# Patient Record
Sex: FEMALE | NOT HISPANIC OR LATINO | Employment: FULL TIME | URBAN - METROPOLITAN AREA
[De-identification: names, ages, dates, MRNs, and addresses within clinical notes are randomized per-mention and may not be internally consistent; named-entity substitution may affect disease eponyms.]

---

## 2017-12-19 LAB — PAP SMEAR - HIM PATIENT REPORTED: NEGATIVE

## 2018-03-12 LAB
ABO + RH BLD: NORMAL
ABO + RH BLD: NORMAL
BLD GP AB SCN SERPL QL: NORMAL
HBV SURFACE AG SERPL QL IA: NORMAL
HIV 1+2 AB+HIV1 P24 AG SERPL QL IA: NORMAL
RUBELLA ANTIBODY IGG QUANTITATIVE: NORMAL IU/ML
T PALLIDUM IGG SER QL: NORMAL

## 2018-09-25 LAB — GROUP B STREP PCR: NORMAL

## 2018-10-13 ENCOUNTER — HOSPITAL ENCOUNTER (OUTPATIENT)
Facility: CLINIC | Age: 38
Discharge: HOME | End: 2018-10-13
Attending: OBSTETRICS & GYNECOLOGY | Admitting: OBSTETRICS & GYNECOLOGY
Payer: COMMERCIAL

## 2018-10-13 VITALS — TEMPERATURE: 97.5 F | DIASTOLIC BLOOD PRESSURE: 66 MMHG | SYSTOLIC BLOOD PRESSURE: 111 MMHG | RESPIRATION RATE: 16 BRPM

## 2018-10-13 LAB
ALT SERPL W P-5'-P-CCNC: 15 U/L (ref 0–50)
ANION GAP SERPL CALCULATED.3IONS-SCNC: 9 MMOL/L (ref 3–14)
AST SERPL W P-5'-P-CCNC: 16 U/L (ref 0–45)
BUN SERPL-MCNC: 10 MG/DL (ref 7–30)
CALCIUM SERPL-MCNC: 9 MG/DL (ref 8.5–10.1)
CHLORIDE SERPL-SCNC: 105 MMOL/L (ref 94–109)
CO2 SERPL-SCNC: 24 MMOL/L (ref 20–32)
CREAT SERPL-MCNC: 0.52 MG/DL (ref 0.52–1.04)
CREAT UR-MCNC: 12 MG/DL
ERYTHROCYTE [DISTWIDTH] IN BLOOD BY AUTOMATED COUNT: 13.2 % (ref 10–15)
GFR SERPL CREATININE-BSD FRML MDRD: >90 ML/MIN/1.7M2
GLUCOSE SERPL-MCNC: 98 MG/DL (ref 70–99)
HCT VFR BLD AUTO: 36.2 % (ref 35–47)
HGB BLD-MCNC: 12 G/DL (ref 11.7–15.7)
MCH RBC QN AUTO: 28 PG (ref 26.5–33)
MCHC RBC AUTO-ENTMCNC: 33.1 G/DL (ref 31.5–36.5)
MCV RBC AUTO: 85 FL (ref 78–100)
PLATELET # BLD AUTO: 188 10E9/L (ref 150–450)
POTASSIUM SERPL-SCNC: 3.9 MMOL/L (ref 3.4–5.3)
PROT UR-MCNC: <0.05 G/L
PROT/CREAT 24H UR: NORMAL G/G CR (ref 0–0.2)
RBC # BLD AUTO: 4.28 10E12/L (ref 3.8–5.2)
SODIUM SERPL-SCNC: 138 MMOL/L (ref 133–144)
URATE SERPL-MCNC: 5.1 MG/DL (ref 2.6–6)
WBC # BLD AUTO: 10.2 10E9/L (ref 4–11)

## 2018-10-13 PROCEDURE — 25000132 ZZH RX MED GY IP 250 OP 250 PS 637

## 2018-10-13 PROCEDURE — 80048 BASIC METABOLIC PNL TOTAL CA: CPT | Performed by: OBSTETRICS & GYNECOLOGY

## 2018-10-13 PROCEDURE — 84450 TRANSFERASE (AST) (SGOT): CPT | Performed by: OBSTETRICS & GYNECOLOGY

## 2018-10-13 PROCEDURE — 84550 ASSAY OF BLOOD/URIC ACID: CPT | Performed by: OBSTETRICS & GYNECOLOGY

## 2018-10-13 PROCEDURE — G0463 HOSPITAL OUTPT CLINIC VISIT: HCPCS | Mod: 25

## 2018-10-13 PROCEDURE — 84156 ASSAY OF PROTEIN URINE: CPT | Performed by: OBSTETRICS & GYNECOLOGY

## 2018-10-13 PROCEDURE — 84460 ALANINE AMINO (ALT) (SGPT): CPT | Performed by: OBSTETRICS & GYNECOLOGY

## 2018-10-13 PROCEDURE — 36415 COLL VENOUS BLD VENIPUNCTURE: CPT | Performed by: OBSTETRICS & GYNECOLOGY

## 2018-10-13 PROCEDURE — 59025 FETAL NON-STRESS TEST: CPT

## 2018-10-13 PROCEDURE — 82565 ASSAY OF CREATININE: CPT | Performed by: OBSTETRICS & GYNECOLOGY

## 2018-10-13 PROCEDURE — 85027 COMPLETE CBC AUTOMATED: CPT | Performed by: OBSTETRICS & GYNECOLOGY

## 2018-10-13 RX ORDER — PRENATAL VIT/IRON FUM/FOLIC AC 27MG-0.8MG
1 TABLET ORAL DAILY
COMMUNITY
End: 2022-05-04

## 2018-10-13 RX ORDER — ACETAMINOPHEN 325 MG/1
TABLET ORAL
Status: COMPLETED
Start: 2018-10-13 | End: 2018-10-13

## 2018-10-13 RX ORDER — METHYLPREDNISOLONE 4 MG
8 TABLET, DOSE PACK ORAL SEE ADMIN INSTRUCTIONS
Status: ON HOLD | COMMUNITY
End: 2018-10-19

## 2018-10-13 RX ORDER — ONDANSETRON 2 MG/ML
4 INJECTION INTRAMUSCULAR; INTRAVENOUS EVERY 6 HOURS PRN
Status: DISCONTINUED | OUTPATIENT
Start: 2018-10-13 | End: 2018-10-13 | Stop reason: HOSPADM

## 2018-10-13 RX ORDER — ACETAMINOPHEN 325 MG/1
975 TABLET ORAL ONCE
Status: COMPLETED | OUTPATIENT
Start: 2018-10-13 | End: 2018-10-13

## 2018-10-13 RX ADMIN — ACETAMINOPHEN 975 MG: 325 TABLET ORAL at 14:41

## 2018-10-13 RX ADMIN — ACETAMINOPHEN 975 MG: 325 TABLET, FILM COATED ORAL at 14:41

## 2018-10-13 NOTE — DISCHARGE INSTRUCTIONS
Discharge Instruction for Undelivered Patients      You were seen for: Evaluation of headache and possible preeclampsia  We Consulted: Dr. Rakel Dawkins  You had (Test or Medicine):Fetal Non Stress Test, Labs and UA     Diet:   Drink 8 to 12 glasses of liquids (milk, juice, water) every day.  You may eat meals and snacks.     Activity:  Count fetal kicks everyday (see handout)  Call your doctor or nurse midwife if your baby is moving less than usual.     Call your provider if you notice:  Swelling in your face or increased swelling in your hands or legs.  Headaches that are not relieved by Tylenol (acetaminophen).  Changes in your vision (blurring: seeing spots or stars.)  Nausea (sick to your stomach) and vomiting (throwing up).   Weight gain of 5 pounds or more per week.  Heartburn that doesn't go away.  Signs of bladder infection: pain when you urinate (use the toilet), need to go more often and more urgently.  The bag of salgado (rupture of membranes) breaks, or you notice leaking in your underwear.  Bright red blood in your underwear.  Abdominal (lower belly) or stomach pain.  For first baby: Contractions (tightening) less than 5 minutes apart for one hour or more.    Follow-up:  As scheduled in the clinic

## 2018-10-13 NOTE — IP AVS SNAPSHOT
MRN:[id number]                      After Visit Summary   10/13/2018    Gina Baez    MRN: [id number]           Thank you!     Thank you for choosing [facility] for your care. Our goal is always to provide you with excellent care. Hearing back from our patients is one way we can continue to improve our services. Please take a few minutes to complete the written survey that you may receive in the mail after you visit with us. Thank you!        Patient Information     Date Of Birth          1980        About your hospital stay     You were admitted on:  October 13, 2018 You last received care in the:  [facility]    You were discharged on:  October 13, 2018       Who to Call     For medical emergencies, please call 911.  For non-urgent questions about your medical care, please call your primary care provider or clinic, [phone]          Attending Provider     Provider Rakel Martinez MD OB/Gyn       Primary Care Provider Office Phone # Fax #    Tierney Lester Shaw [phone]      Further instructions from your care team       Discharge Instruction for Undelivered Patients      You were seen for: Evaluation of headache and possible preeclampsia  We Consulted: Dr. Rakel Dawkins  You had (Test or Medicine):Fetal Non Stress Test, Labs and UA     Diet:   Drink 8 to 12 glasses of liquids (milk, juice, water) every day.  You may eat meals and snacks.     Activity:  Count fetal kicks everyday (see handout)  Call your doctor or nurse midwife if your baby is moving less than usual.     Call your provider if you notice:  Swelling in your face or increased swelling in your hands or legs.  Headaches that are not relieved by Tylenol (acetaminophen).  Changes in your vision (blurring: seeing spots or stars.)  Nausea (sick to your stomach) and vomiting (throwing up).   Weight gain of 5 pounds or more per week.  Heartburn that doesn't go  "away.  Signs of bladder infection: pain when you urinate (use the toilet), need to go more often and more urgently.  The bag of salgado (rupture of membranes) breaks, or you notice leaking in your underwear.  Bright red blood in your underwear.  Abdominal (lower belly) or stomach pain.  For first baby: Contractions (tightening) less than 5 minutes apart for one hour or more.    Follow-up:  As scheduled in the clinic          Pending Results     Date and Time Order Name Status Description    10/13/2018 1407 Creatinine urine calculation only In process     10/13/2018 1234 Protein  random urine with Creat Ratio In process             Admission Information     Date & Time Provider Department Dept. Phone    10/13/2018 Rakel Dawkins MD [facility] [phone]      Your Vitals Were     Blood Pressure Temperature Respirations             111/66 97.5  F (36.4  C) (Temporal) 16         MyChart Information     [facility] lets you send messages to your doctor, view your test results, renew your prescriptions, schedule appointments and more. To sign up, go to [email or web address] . Click on \"Log in\" on the left side of the screen, which will take you to the Welcome page. Then click on \"Sign up Now\" on the right side of the page.     You will be asked to enter the access code listed below, as well as some personal information. Please follow the directions to create your username and password.     Your access code is: [email or web address]  Expires: 2019  2:35 PM     Your access code will  in 90 days. If you need help or a new code, please call your [facility] clinic or [phone].        Care EveryWhere ID     This is your Care EveryWhere ID. This could be used by other organizations to access your [facility] medical records  [id number]        Equal Access to Services     UMBERTO VELASCO : Laura Martinez, rosemary paniagua, qageoffta kaalisabel romero. So [facility] " [phone].    ATENCIÓN: Si sarbjit shah, tiene a anne disposición servicios gratuitos de asistencia lingüística. Nallely styles [phone].    We comply with applicable federal civil rights laws and [place] laws. We do not discriminate on the basis of race, color, national origin, age, disability, sex, sexual orientation, or gender identity.               Review of your medicines      UNREVIEWED medicines. Ask your doctor about these medicines        Dose / Directions    methylPREDNISolone 4 MG tablet   Commonly known as:  MEDROL DOSEPAK        Dose:  8 mg   Take 8 mg by mouth See Admin Instructions follow package directions   Refills:  0       prenatal multivitamin plus iron 27-0.8 MG Tabs per tablet        Dose:  1 tablet   Take 1 tablet by mouth daily   Refills:  0       SYNTHROID PO        Dose:  137 mcg   Take 137 mcg by mouth daily   Refills:  0       VITAMIN D (CHOLECALCIFEROL) PO        Dose:  2000 Units   Take 2,000 Units by mouth daily   Refills:  0                Protect others around you: Learn how to safely use, store and throw away your medicines at www.disposemymeds.org.             Medication List: This is a list of all your medications and when to take them. Check marks below indicate your daily home schedule. Keep this list as a reference.      Medications           Morning Afternoon Evening Bedtime As Needed    methylPREDNISolone 4 MG tablet   Commonly known as:  MEDROL DOSEPAK   Take 8 mg by mouth See Admin Instructions follow package directions                                prenatal multivitamin plus iron 27-0.8 MG Tabs per tablet   Take 1 tablet by mouth daily                                SYNTHROID PO   Take 137 mcg by mouth daily                                VITAMIN D (CHOLECALCIFEROL) PO   Take 2,000 Units by mouth daily

## 2018-10-13 NOTE — IP AVS SNAPSHOT
[facility]    [address] [place] [address], [address]    [facility] [address]    Phone:  [phone]                                       After Visit Summary   10/13/2018    Gina Baez    MRN: [id number]           After Visit Summary Signature Page     I have received my discharge instructions, and my questions have been answered. I have discussed any challenges I see with this plan with the nurse or doctor.    ..........................................................................................................................................  Patient/Patient Representative Signature      ..........................................................................................................................................  Patient Representative Print Name and Relationship to Patient    ..................................................               ................................................  Date                                   Time    ..........................................................................................................................................  Reviewed by Signature/Title    ...................................................              ..............................................  Date                                               Time          22EPIC Rev 08/18

## 2018-10-13 NOTE — PLAN OF CARE
"Pt presents, ambulatory, accompanied by her  Will, stating \"evaluation of a headache possible related to BP's\" as the reason for her visit. Pt to MAC room 1 with Cisco Umanzor RN. Verbal consent for EFM/toco received. Monitors applied. Fht's 140's. Admission documentation completed by Cisco Umanzor RN.     Pt is a  39w3d with a recent diagnosis of PUPPS and was started on a Medrol pack on Thursday. Pt states she has been unable to sleep well since starting the medication and had an onset of a headache Friday 10/12/18 that has been persistent. Pt denies taking pain medication or any other interventions for the headache. Pt's BP's at her last 2 prenatal visits were 120/80 and 120/90 respectively and thus was sent here for an evaluation of possible preeclampsia by Dr. Dawkins. Pt reports +FM, denies visual disturbances, epigastric pain, VB, LOF, abdominal cramping, low back pain, pelvic discomfort or dysuria.   Reactive NST obtained. Presumed adequate fetal oxygenation at this time.   UA collected and sent  Dr. Dawkins updated regarding, but not limited to, Serial BP's, Lab results, uterine activity, fht's and pts report of headache pain. Order received for PO Tylenol and discharge to home.   PO tylenol per order given. UA resulted and negative. Discharge orders reviewed with pt and spouse. Questions answered.  Both verbalized understanding and agreement. Pt d/c'd to home, ambulatory.     "

## 2018-10-19 ENCOUNTER — HOSPITAL ENCOUNTER (INPATIENT)
Facility: CLINIC | Age: 38
LOS: 2 days | Discharge: HOME | End: 2018-10-21
Attending: OBSTETRICS & GYNECOLOGY | Admitting: SPECIALIST
Payer: COMMERCIAL

## 2018-10-19 LAB
ABO + RH BLD: NORMAL
ABO + RH BLD: NORMAL
BLD GP AB SCN SERPL QL: NORMAL
BLOOD BANK CMNT PATIENT-IMP: NORMAL
SPECIMEN EXP DATE BLD: NORMAL
T PALLIDUM AB SER QL: NONREACTIVE

## 2018-10-19 PROCEDURE — 86850 RBC ANTIBODY SCREEN: CPT | Performed by: SPECIALIST

## 2018-10-19 PROCEDURE — 86780 TREPONEMA PALLIDUM: CPT | Performed by: SPECIALIST

## 2018-10-19 PROCEDURE — 25000128 H RX IP 250 OP 636: Performed by: SPECIALIST

## 2018-10-19 PROCEDURE — 12000037 ZZH R&B POSTPARTUM INTERMEDIATE

## 2018-10-19 PROCEDURE — 86900 BLOOD TYPING SEROLOGIC ABO: CPT | Performed by: SPECIALIST

## 2018-10-19 PROCEDURE — 36415 COLL VENOUS BLD VENIPUNCTURE: CPT | Performed by: SPECIALIST

## 2018-10-19 PROCEDURE — 59025 FETAL NON-STRESS TEST: CPT

## 2018-10-19 PROCEDURE — 0KQM0ZZ REPAIR PERINEUM MUSCLE, OPEN APPROACH: ICD-10-PCS | Performed by: SPECIALIST

## 2018-10-19 PROCEDURE — 0UQGXZZ REPAIR VAGINA, EXTERNAL APPROACH: ICD-10-PCS | Performed by: SPECIALIST

## 2018-10-19 PROCEDURE — 25000128 H RX IP 250 OP 636

## 2018-10-19 PROCEDURE — 72200001 ZZH LABOR CARE VAGINAL DELIVERY SINGLE

## 2018-10-19 PROCEDURE — 25000125 ZZHC RX 250: Performed by: SPECIALIST

## 2018-10-19 PROCEDURE — 25000132 ZZH RX MED GY IP 250 OP 250 PS 637: Performed by: SPECIALIST

## 2018-10-19 PROCEDURE — 12000031 ZZH R&B OB CRITICAL

## 2018-10-19 PROCEDURE — G0463 HOSPITAL OUTPT CLINIC VISIT: HCPCS | Mod: 25

## 2018-10-19 PROCEDURE — 86901 BLOOD TYPING SEROLOGIC RH(D): CPT | Performed by: SPECIALIST

## 2018-10-19 RX ORDER — OXYTOCIN 10 [USP'U]/ML
10 INJECTION, SOLUTION INTRAMUSCULAR; INTRAVENOUS
Status: DISCONTINUED | OUTPATIENT
Start: 2018-10-19 | End: 2018-10-19

## 2018-10-19 RX ORDER — METHYLERGONOVINE MALEATE 0.2 MG/ML
200 INJECTION INTRAVENOUS
Status: DISCONTINUED | OUTPATIENT
Start: 2018-10-19 | End: 2018-10-21 | Stop reason: HOSPADM

## 2018-10-19 RX ORDER — LEVOTHYROXINE SODIUM 137 UG/1
137 TABLET ORAL DAILY
Status: DISCONTINUED | OUTPATIENT
Start: 2018-10-20 | End: 2018-10-21 | Stop reason: HOSPADM

## 2018-10-19 RX ORDER — OXYTOCIN/0.9 % SODIUM CHLORIDE 30/500 ML
100-340 PLASTIC BAG, INJECTION (ML) INTRAVENOUS CONTINUOUS PRN
Status: COMPLETED | OUTPATIENT
Start: 2018-10-19 | End: 2018-10-19

## 2018-10-19 RX ORDER — FENTANYL CITRATE 50 UG/ML
INJECTION, SOLUTION INTRAMUSCULAR; INTRAVENOUS
Status: COMPLETED
Start: 2018-10-19 | End: 2018-10-19

## 2018-10-19 RX ORDER — ACETAMINOPHEN 325 MG/1
650 TABLET ORAL EVERY 4 HOURS PRN
Status: DISCONTINUED | OUTPATIENT
Start: 2018-10-19 | End: 2018-10-21 | Stop reason: HOSPADM

## 2018-10-19 RX ORDER — ONDANSETRON 2 MG/ML
4 INJECTION INTRAMUSCULAR; INTRAVENOUS EVERY 6 HOURS PRN
Status: DISCONTINUED | OUTPATIENT
Start: 2018-10-19 | End: 2018-10-19

## 2018-10-19 RX ORDER — MISOPROSTOL 200 UG/1
800 TABLET ORAL
Status: DISCONTINUED | OUTPATIENT
Start: 2018-10-19 | End: 2018-10-21 | Stop reason: HOSPADM

## 2018-10-19 RX ORDER — NALOXONE HYDROCHLORIDE 0.4 MG/ML
.1-.4 INJECTION, SOLUTION INTRAMUSCULAR; INTRAVENOUS; SUBCUTANEOUS
Status: DISCONTINUED | OUTPATIENT
Start: 2018-10-19 | End: 2018-10-21 | Stop reason: HOSPADM

## 2018-10-19 RX ORDER — OXYTOCIN/0.9 % SODIUM CHLORIDE 30/500 ML
340 PLASTIC BAG, INJECTION (ML) INTRAVENOUS CONTINUOUS PRN
Status: DISCONTINUED | OUTPATIENT
Start: 2018-10-19 | End: 2018-10-21 | Stop reason: HOSPADM

## 2018-10-19 RX ORDER — TERBUTALINE SULFATE 1 MG/ML
0.25 INJECTION, SOLUTION SUBCUTANEOUS
Status: DISCONTINUED | OUTPATIENT
Start: 2018-10-19 | End: 2018-10-19

## 2018-10-19 RX ORDER — ACETAMINOPHEN 325 MG/1
650 TABLET ORAL EVERY 4 HOURS PRN
Status: DISCONTINUED | OUTPATIENT
Start: 2018-10-19 | End: 2018-10-19

## 2018-10-19 RX ORDER — CARBOPROST TROMETHAMINE 250 UG/ML
250 INJECTION, SOLUTION INTRAMUSCULAR
Status: DISCONTINUED | OUTPATIENT
Start: 2018-10-19 | End: 2018-10-19

## 2018-10-19 RX ORDER — FENTANYL CITRATE 50 UG/ML
100 INJECTION, SOLUTION INTRAMUSCULAR; INTRAVENOUS
Status: DISCONTINUED | OUTPATIENT
Start: 2018-10-19 | End: 2018-10-19

## 2018-10-19 RX ORDER — METHYLERGONOVINE MALEATE 0.2 MG/ML
200 INJECTION INTRAVENOUS
Status: DISCONTINUED | OUTPATIENT
Start: 2018-10-19 | End: 2018-10-19

## 2018-10-19 RX ORDER — OXYTOCIN/0.9 % SODIUM CHLORIDE 30/500 ML
100 PLASTIC BAG, INJECTION (ML) INTRAVENOUS CONTINUOUS
Status: DISCONTINUED | OUTPATIENT
Start: 2018-10-19 | End: 2018-10-21 | Stop reason: HOSPADM

## 2018-10-19 RX ORDER — MISOPROSTOL 100 UG/1
25 TABLET ORAL
Status: DISCONTINUED | OUTPATIENT
Start: 2018-10-19 | End: 2018-10-19

## 2018-10-19 RX ORDER — IBUPROFEN 400 MG/1
800 TABLET, FILM COATED ORAL EVERY 6 HOURS PRN
Status: DISCONTINUED | OUTPATIENT
Start: 2018-10-19 | End: 2018-10-21 | Stop reason: HOSPADM

## 2018-10-19 RX ORDER — LANOLIN 100 %
OINTMENT (GRAM) TOPICAL
Status: DISCONTINUED | OUTPATIENT
Start: 2018-10-19 | End: 2018-10-21 | Stop reason: HOSPADM

## 2018-10-19 RX ORDER — IBUPROFEN 400 MG/1
800 TABLET, FILM COATED ORAL
Status: DISCONTINUED | OUTPATIENT
Start: 2018-10-19 | End: 2018-10-19

## 2018-10-19 RX ORDER — OXYCODONE AND ACETAMINOPHEN 5; 325 MG/1; MG/1
1 TABLET ORAL
Status: DISCONTINUED | OUTPATIENT
Start: 2018-10-19 | End: 2018-10-19

## 2018-10-19 RX ORDER — BISACODYL 10 MG
10 SUPPOSITORY, RECTAL RECTAL DAILY PRN
Status: DISCONTINUED | OUTPATIENT
Start: 2018-10-21 | End: 2018-10-21 | Stop reason: HOSPADM

## 2018-10-19 RX ORDER — OXYTOCIN 10 [USP'U]/ML
10 INJECTION, SOLUTION INTRAMUSCULAR; INTRAVENOUS
Status: DISCONTINUED | OUTPATIENT
Start: 2018-10-19 | End: 2018-10-21 | Stop reason: HOSPADM

## 2018-10-19 RX ORDER — DOCUSATE SODIUM 100 MG/1
100 CAPSULE, LIQUID FILLED ORAL 2 TIMES DAILY
Status: DISCONTINUED | OUTPATIENT
Start: 2018-10-19 | End: 2018-10-21 | Stop reason: HOSPADM

## 2018-10-19 RX ORDER — SODIUM CHLORIDE, SODIUM LACTATE, POTASSIUM CHLORIDE, CALCIUM CHLORIDE 600; 310; 30; 20 MG/100ML; MG/100ML; MG/100ML; MG/100ML
INJECTION, SOLUTION INTRAVENOUS CONTINUOUS
Status: DISCONTINUED | OUTPATIENT
Start: 2018-10-19 | End: 2018-10-19

## 2018-10-19 RX ORDER — NALOXONE HYDROCHLORIDE 0.4 MG/ML
.1-.4 INJECTION, SOLUTION INTRAMUSCULAR; INTRAVENOUS; SUBCUTANEOUS
Status: DISCONTINUED | OUTPATIENT
Start: 2018-10-19 | End: 2018-10-19

## 2018-10-19 RX ORDER — HYDROCORTISONE 2.5 %
CREAM (GRAM) TOPICAL 3 TIMES DAILY PRN
Status: DISCONTINUED | OUTPATIENT
Start: 2018-10-19 | End: 2018-10-21 | Stop reason: HOSPADM

## 2018-10-19 RX ADMIN — Medication 25 MCG: at 15:45

## 2018-10-19 RX ADMIN — FENTANYL CITRATE 100 MCG: 50 INJECTION, SOLUTION INTRAMUSCULAR; INTRAVENOUS at 18:35

## 2018-10-19 RX ADMIN — Medication 25 MCG: at 17:52

## 2018-10-19 RX ADMIN — DOCUSATE SODIUM 100 MG: 100 CAPSULE, LIQUID FILLED ORAL at 23:16

## 2018-10-19 RX ADMIN — OXYTOCIN-SODIUM CHLORIDE 0.9% IV SOLN 30 UNIT/500ML 340 ML/HR: 30-0.9/5 SOLUTION at 20:54

## 2018-10-19 RX ADMIN — IBUPROFEN 800 MG: 400 TABLET ORAL at 23:16

## 2018-10-19 RX ADMIN — Medication 25 MCG: at 10:39

## 2018-10-19 RX ADMIN — Medication 25 MCG: at 08:34

## 2018-10-19 RX ADMIN — Medication 25 MCG: at 12:29

## 2018-10-19 RX ADMIN — ACETAMINOPHEN 650 MG: 325 TABLET, FILM COATED ORAL at 23:16

## 2018-10-19 RX ADMIN — FENTANYL CITRATE 100 MCG: 50 INJECTION, SOLUTION INTRAMUSCULAR; INTRAVENOUS at 21:08

## 2018-10-19 NOTE — IP AVS SNAPSHOT
MRN:[id number]                      After Visit Summary   10/19/2018    Gina Baez    MRN: [id number]           Thank you!     Thank you for choosing [facility] for your care. Our goal is always to provide you with excellent care. Hearing back from our patients is one way we can continue to improve our services. Please take a few minutes to complete the written survey that you may receive in the mail after you visit with us. Thank you!        Patient Information     Date Of Birth          1980        About your hospital stay     You were admitted on:  October 19, 2018 You last received care in the:  [address]    You were discharged on:  October 21, 2018        Reason for your hospital stay       Maternity care                  Who to Call     For medical emergencies, please call 911.  For non-urgent questions about your medical care, please call your primary care provider or clinic, [phone]          Attending Provider     Provider Specialty    Rakel Dawkins MD OB/Gyn    [place], Balbina Wiley MD OB/Gyn    , Tierney Batista MD OB/Gyn       Primary Care Provider Office Phone # Fax #    Tierney Shaw [phone]      After Care Instructions     Activity       Review discharge instructions            Diet       Resume previous diet            Discharge Instructions - Postpartum visit       Schedule postpartum visit with your provider and return to clinic in 6 weeks.                  Follow-up Appointments     Follow Up and recommended labs and tests       [facility]                  Further instructions from your care team       Postpartum Vaginal Delivery Instructions    Activity       Ask family and friends for help when you need it.    Do not place anything in your vagina for 6 weeks.    You are not restricted on other activities, but take it easy for a few weeks to allow your body to  recover from delivery.  You are able to do any activities you feel up to that point.    No driving until you have stopped taking your pain medications (usually two weeks after delivery).     Call your health care provider if you have any of these symptoms:       Increased pain, swelling, redness, or fluid around your stiches from an episiotomy or perineal tear.    A fever above 100.4 F (38 C) with or without chills when placing a thermometer under your tongue.    You soak a sanitary pad with blood within 1 hour, or you see blood clots larger than a golf ball.    Bleeding that lasts more than 6 weeks.    Vaginal discharge that smells bad.    Severe pain, cramping or tenderness in your lower belly area.    A need to urinate more frequently (use the toilet more often), more urgently (use the toilet very quickly), or it burns when you urinate.    Nausea and vomiting.    Redness, swelling or pain around a vein in your leg.    Problems breastfeeding or a red or painful area on your breast.    Chest pain and cough or are gasping for air.    Problems coping with sadness, anxiety, or depression.  If you have any concerns about hurting yourself or the baby, call your provider immediately.     You have questions or concerns after you return home.     Keep your hands clean:  Always wash your hands before touching your perineal area and stitches.  This helps reduce your risk of infection.  If your hands aren't dirty, you may use an alcohol hand-rub to clean your hands. Keep your nails clean and short.        Pending Results     No orders found from 10/17/2018 to 10/20/2018.            Statement of Approval     Ordered          10/21/18 1200  I have reviewed and agree with all the recommendations and orders detailed in this document.  EFFECTIVE NOW     Approved and electronically signed by:  Balbina Perry MD             Admission Information     Date & Time Provider Department Dept. Phone    10/19/2018 Tierney Shaw  "MD Lester [address] [phone]      Your Vitals Were     Blood Pressure Pulse Temperature Respirations Pulse Oximetry       97/57 (BP Location: Left arm) 79 97.8  F (36.6  C) (Oral) 16 98%       MyChart Information     [email or web address] lets you send messages to your doctor, view your test results, renew your prescriptions, schedule appointments and more. To sign up, go to [email or web address] . Click on \"Log in\" on the left side of the screen, which will take you to the Welcome page. Then click on \"Sign up Now\" on the right side of the page.     You will be asked to enter the access code listed below, as well as some personal information. Please follow the directions to create your username and password.     Your access code is: [email or web address]  Expires: 2019  2:35 PM     Your access code will  in 90 days. If you need help or a new code, please call your [facility] clinic or [phone].        Care EveryWhere ID     This is your Care EveryWhere ID. This could be used by other organizations to access your [facility] medical records  [id number]        Equal Access to Services     UMBERTO VELASCO : Laura Martinez, rosemary paniagua, dat bishop, isabel johnson. So [facility] [phone].    ATENCIÓN: Si habla español, tiene a anne disposición servicios gratuitos de asistencia lingüística. Llame al [phone].    We comply with applicable federal civil rights laws and [place] laws. We do not discriminate on the basis of race, color, national origin, age, disability, sex, sexual orientation, or gender identity.               Review of your medicines      START taking        Dose / Directions    acetaminophen 325 MG tablet   Commonly known as:  TYLENOL        Dose:  325-650 mg   Take 1-2 tablets (325-650 mg) by mouth every 4 hours as needed for mild pain or fever (greater than or equal to 38? C /100.4? F (oral) or 38.5? C/ 101.4? F " (core).)   Quantity:  30 tablet   Refills:  0       ibuprofen 600 MG tablet   Commonly known as:  ADVIL/MOTRIN        Dose:  600 mg   Take 1 tablet (600 mg) by mouth every 6 hours as needed for other (cramping)   Quantity:  30 tablet   Refills:  0         CONTINUE these medicines which have NOT CHANGED        Dose / Directions    prenatal multivitamin plus iron 27-0.8 MG Tabs per tablet        Dose:  1 tablet   Take 1 tablet by mouth daily   Refills:  0       SYNTHROID PO        Dose:  137 mcg   Take 137 mcg by mouth daily   Refills:  0       VITAMIN D (CHOLECALCIFEROL) PO        Dose:  2000 Units   Take 2,000 Units by mouth daily   Refills:  0            Where to get your medicines      Some of these will need a paper prescription and others can be bought over the counter. Ask your nurse if you have questions.     Bring a paper prescription for each of these medications     acetaminophen 325 MG tablet    ibuprofen 600 MG tablet                Protect others around you: Learn how to safely use, store and throw away your medicines at www.disposemymeds.org.             Medication List: This is a list of all your medications and when to take them. Check marks below indicate your daily home schedule. Keep this list as a reference.      Medications           Morning Afternoon Evening Bedtime As Needed    acetaminophen 325 MG tablet   Commonly known as:  TYLENOL   Take 1-2 tablets (325-650 mg) by mouth every 4 hours as needed for mild pain or fever (greater than or equal to 38? C /100.4? F (oral) or 38.5? C/ 101.4? F (core).)   Last time this was given:  650 mg on 10/21/2018 10:27 AM                                ibuprofen 600 MG tablet   Commonly known as:  ADVIL/MOTRIN   Take 1 tablet (600 mg) by mouth every 6 hours as needed for other (cramping)   Last time this was given:  800 mg on 10/21/2018 10:27 AM                                prenatal multivitamin plus iron 27-0.8 MG Tabs per tablet   Take 1 tablet by mouth  daily                                SYNTHROID PO   Take 137 mcg by mouth daily   Last time this was given:  137 mcg on 10/21/2018  6:48 AM                                VITAMIN D (CHOLECALCIFEROL) PO   Take 2,000 Units by mouth daily

## 2018-10-19 NOTE — IP AVS SNAPSHOT
[address]    [place] [address]    Phone:  [phone]                                       After Visit Summary   10/19/2018    Gina Baez    MRN: [id number]           After Visit Summary Signature Page     I have received my discharge instructions, and my questions have been answered. I have discussed any challenges I see with this plan with the nurse or doctor.    ..........................................................................................................................................  Patient/Patient Representative Signature      ..........................................................................................................................................  Patient Representative Print Name and Relationship to Patient    ..................................................               ................................................  Date                                   Time    ..........................................................................................................................................  Reviewed by Signature/Title    ...................................................              ..............................................  Date                                               Time          22EPIC Rev 08/18

## 2018-10-19 NOTE — PLAN OF CARE
Pt to MAC for eval of SROM. TOCO and EFM applied. Pt grossly ruptured, clear fluid. Occ contractions, not feeling. Dr Perry on unit, updated on pt status. She will make a plan. Report to CRISTIANA Knutson RN to assume care.

## 2018-10-19 NOTE — PLAN OF CARE
10/19/18- 0800- Dr Perry at bedside -NON for oral cytotec.  1745-update given to Dr Perry-oral cytotec x12 doses ordered.  1900- Increased back discomfort with ctx- mild to moderate on palpation-IV pain meds adm.-IV bolus given- patient resting with counter pressure to lower back via spouse. Report given to next shift.

## 2018-10-19 NOTE — H&P
OB ADMISSION NOTE    CHIEF COMPLAINT:   Presents to labor and delivery with SROM    HISTORY OF PRESENT ILLNESS:  37 yo  at 40+2 weeks presented with SROM this am.  Was noted to have contractions in the MAC but was not feeling them.  Pregnancy complicated by hypothyroidism and AMA.    OBSTETRICAL / DATING HISTORY:  Estimated Date of Delivery: Oct 17, 2018  Gestational Age:  40w2d    Obstetric History       T0      L0     SAB1   TAB0   Ectopic0   Multiple0   Live Births0       # Outcome Date GA Lbr Ayaan/2nd Weight Sex Delivery Anes PTL Lv   2 Current            1 SAB                    LAB TESTING:  See prenatal records.      PAST MEDICAL HISTORY:  Past Medical History:   Diagnosis Date     Pregnancy pruritus, third trimester      Thyroid disease     hypothroid       PAST SURGICAL HISTORY:  History reviewed. No pertinent surgical history.    ALLERGIES:  NKA    HABITS:  No tobacco//etoh/drugs    HISTORY OF PRESENT ILLNESS:    (Please see scanned  sheets for prenatal history. Examination at the time of admission revealed no interval change in the patient s history or physical exam except as described below.)    Prenatal chart not available at this time as she was just admitted.      REVIEW OF SYSTEMS:  NEUROLOGIC:  Negative  EYES:  Negative  ENT:  Negative  GI:  Negative  BREAST:  Negative  :  Negative  GYN:  Negative  CV:  Negative  PULMONARY:  Negative  MUSCULOSKELETAL:  Negative  PSYCH:  Negative  PHYSICAL EXAM: /81  Temp 98.3  F (36.8  C) (Oral)    Gen:  NAD  CV: Regular rate  RESP:  Unlabored breathing  ABDOMEN: gravid, nontender  Membrane Status:  Grossly ruptured  Fetal Presentation:  vertex  Cervix:  1/60/-1/ medium/posterior  GBS:  neg  EFW:  7-8#    Fetal heart tones: category 1  TOCO: irritablity    Impression:  IUP at 40w2d with SROM  Patient Active Problem List   Diagnosis     Indication for care or intervention in labor or delivery     Indication for care in  labor or delivery       Plan:  Oral cytotec until more favorable, then will start pitocin when more favorable.    Balbina Perry ....................  10/19/2018   7:59 AM , pager: [phone]

## 2018-10-20 PROCEDURE — 25000132 ZZH RX MED GY IP 250 OP 250 PS 637: Performed by: SPECIALIST

## 2018-10-20 PROCEDURE — 12000037 ZZH R&B POSTPARTUM INTERMEDIATE

## 2018-10-20 RX ADMIN — LEVOTHYROXINE SODIUM 137 MCG: 137 TABLET ORAL at 05:55

## 2018-10-20 RX ADMIN — ACETAMINOPHEN 650 MG: 325 TABLET, FILM COATED ORAL at 15:17

## 2018-10-20 RX ADMIN — DOCUSATE SODIUM 100 MG: 100 CAPSULE, LIQUID FILLED ORAL at 08:20

## 2018-10-20 RX ADMIN — IBUPROFEN 800 MG: 400 TABLET ORAL at 22:31

## 2018-10-20 RX ADMIN — IBUPROFEN 800 MG: 400 TABLET ORAL at 15:17

## 2018-10-20 RX ADMIN — ACETAMINOPHEN 650 MG: 325 TABLET, FILM COATED ORAL at 22:32

## 2018-10-20 RX ADMIN — DOCUSATE SODIUM 100 MG: 100 CAPSULE, LIQUID FILLED ORAL at 20:00

## 2018-10-20 RX ADMIN — IBUPROFEN 800 MG: 400 TABLET ORAL at 07:20

## 2018-10-20 RX ADMIN — ACETAMINOPHEN 650 MG: 325 TABLET, FILM COATED ORAL at 07:20

## 2018-10-20 NOTE — PROGRESS NOTES
Precipitous delivery by RN, placenta delivered per Dr. Cardoza.  Patient examined. Right sulcal tear present, large 2nd degree laceration present  Fentanyl 100 mcg given IV. Tissue infiltrated with 20 ml 1% Lidocaine   Repair done with 3.0 Vicryl in layers.    cc

## 2018-10-20 NOTE — PROVIDER NOTIFICATION
10/19/18 2010   Provider Notification   Provider Name/Title [place]   Method of Notification Phone   Request Evaluate - Remote   Notification Reason SVE   updated Dr. Perry that patient is complete/+1.  POC per Dr. Perry is to start pushing.

## 2018-10-20 NOTE — PROGRESS NOTES
Pt. admitted from L&D via w/c. Pt. arrived with baby and was accompanied by spouse and arrived with personal belongings. Report was taken from Milena in L&D.  Pt. is A&Ox3 and VSS on RA. Fundus is firm and slightly to the left.  Vaginal bleeding is small.   Pt. c/o 0/10 pain. Pt. Denied c/o nausea, CP, SOB, lightheadedness, or dizziness. LS clear. PIV is SL. Pt. oriented to the room and call light system.

## 2018-10-20 NOTE — L&D DELIVERY NOTE
of viable male at    Baby's weight unavailable     Apgars unavailable     precip delivery by RN Sharmaine Lyn as I entered the room. Head was full delivered as both myself and RN arrived at bedside. RN able to then deliver the Shoulders  easily and nuchal cord x1 was reduced after shoulders delivered and placed infant on maternal abdomen.  Placenta delivered at , intact with a 3vc  Excellent uterine tone  Dr. Shaw arrived and repaired the laceration. See note for details    Primary OB:

## 2018-10-20 NOTE — PLAN OF CARE
Problem: Patient Care Overview  Goal: Plan of Care/Patient Progress Review  Outcome: No Change  VSS.  Pain well controlled with Tyl and Ibu, requesting prn pain medications as needed.  Up independently in room.  Working on breastfeeding  using shield and  cares. On pathway. Continue to monitor and notify MD as needed.

## 2018-10-20 NOTE — PLAN OF CARE
Problem: Patient Care Overview  Goal: Individualization & Mutuality  Outcome: Completed Date Met: 10/19/18  Pt was unable to receive epidural due to timing of cervical dilatation.

## 2018-10-20 NOTE — PROVIDER NOTIFICATION
10/19/18 1950   Provider Notification   Provider Name/Title Jahaira   Method of Notification Phone   Request Evaluate - Remote   Notification Reason Labor Status;Uterine Activity;Other (Comment)  (POC)   spoke with Dr. Perry regarding patient pain level and contraction frequency. POC: SVE and ok to get epidural if cervical change

## 2018-10-20 NOTE — PROGRESS NOTES
Mother using nipple shield. Reviewed indications that infant is receiving adequate nutrition through use of feeding log. Also educated patient regarding examining the nipple shield for droplets of colostrum after breastfeeding occurrence and listening for audible swallowing while breastfeeding.

## 2018-10-20 NOTE — PROGRESS NOTES
[facility]   Post-Partum Progress Note          Assessment and Plan:    Assessment:   Post-partum day #1  Induced vaginal delivery secondary to premature rupture of membranes  L&D complications: Precipitous delivery      Doing well.      Plan:   Pain control measures as needed  Reportable signs and symptoms dicussed with the patient           Interval History:   Doing well.  Pain is well-controlled.  No fevers.  No history of foul-smelling vaginal discharge.  Good appetite.  Denies chest pain, shortness of breath, nausea or vomiting.  Vaginal bleeding is similar to a heavy menstrual flow.  Ambulatory.  Breastfeeding well.          Significant Problems:      Patient Active Problem List   Diagnosis     Indication for care or intervention in labor or delivery     Indication for care in labor or delivery      (spontaneous vaginal delivery)             Review of Systems:    The patient denies any chest pain, shortness of breath, excessive pain, fever, chills, purulent drainage from the wound, nausea or vomiting.          Medications:   All medications related to the patient's surgery have been reviewed          Physical Exam:   All vitals stable  Blood pressure 114/70, pulse 83, temperature 98.1  F (36.7  C), temperature source Oral, resp. rate 16, SpO2 98 %, unknown if currently breastfeeding.  Uterine fundus is firm, non-tender and at the level of the umbilicus          Data:   All laboratory data related to this surgery reviewed  Lab Results   Component Value Date    HGB 12.0 10/13/2018       RAN NERI MD

## 2018-10-20 NOTE — LACTATION NOTE
Initial visit. Infant at breast at time of visit.  Mother laying down in bed and awkwardly positioned in bed.  Encouraged and helped Mother reposition in bed making herself more comfortable and then bringing baby to her.  LC helped position infant in football position.  Reviewed use of nipple shield and helped position on left breast.  Infant brought to breast and immediately able to latch on with a good latch and strong suck.    Breastfeeding general information reviewed.  No further questions at this time. Will follow as needed.   Ilsa Back RN, IBCLC

## 2018-10-20 NOTE — PROVIDER NOTIFICATION
10/19/18 2020   Provider Notification   Provider Name/Title [place]   Method of Notification Phone   Request Evaluate - Remote   Notification Reason Other (Comment)   Updated Dr. Perry that FHT having variable decels with pushing, asked her to come to evaluate.  She said she was stuck at [facility] and would call in her back-up, Dr Shaw.

## 2018-10-20 NOTE — PLAN OF CARE
1950 SVE per POC of Dr. Perry  2000 SVE 10/100/+1, spoke with Dr. Perry- POC start pushing  2020 room set up complete, started pushing  2026 O2 and LR bolus given for variable decels with pushing Dr. Perry updated on decels and asked her to come evaluate.  She reported that her backup would come (Dr. Shaw)  2045 in house OB called for delivery d/t involuntary pushing of patient  2051 living baby boy born  2054 placenta delivered, EBL per Dr. Shaw 300mL, 2nd degree tear and right sulcus, post partum pitocin started immediately  2108 100 mcg of IV fentanyl given for repair pain

## 2018-10-20 NOTE — PLAN OF CARE
Data: Gina Baez transferred to [facility] via wheelchair at 2345. Baby transferred via parent's arms.  Action: Receiving unit notified of transfer: Yes. Patient and family notified of room change. Report given to Noemi Ojeda RN at 0000. Belongings sent to receiving unit. Accompanied by Registered Nurse. Oriented patient to surroundings. Call light within reach. ID bands double-checked with receiving RN.  Response: Patient tolerated transfer and is stable.

## 2018-10-21 VITALS
RESPIRATION RATE: 16 BRPM | TEMPERATURE: 97.8 F | HEART RATE: 79 BPM | SYSTOLIC BLOOD PRESSURE: 97 MMHG | OXYGEN SATURATION: 98 % | DIASTOLIC BLOOD PRESSURE: 57 MMHG

## 2018-10-21 PROCEDURE — 25000132 ZZH RX MED GY IP 250 OP 250 PS 637: Performed by: SPECIALIST

## 2018-10-21 RX ORDER — IBUPROFEN 600 MG/1
600 TABLET, FILM COATED ORAL EVERY 6 HOURS PRN
Qty: 30 TABLET | Refills: 0 | Status: SHIPPED | OUTPATIENT
Start: 2018-10-21 | End: 2022-05-04

## 2018-10-21 RX ORDER — ACETAMINOPHEN 325 MG/1
325-650 TABLET ORAL EVERY 4 HOURS PRN
Qty: 30 TABLET | Refills: 0 | Status: SHIPPED | OUTPATIENT
Start: 2018-10-21 | End: 2022-05-04

## 2018-10-21 RX ADMIN — ACETAMINOPHEN 650 MG: 325 TABLET, FILM COATED ORAL at 10:27

## 2018-10-21 RX ADMIN — ACETAMINOPHEN 650 MG: 325 TABLET, FILM COATED ORAL at 04:22

## 2018-10-21 RX ADMIN — IBUPROFEN 800 MG: 400 TABLET ORAL at 04:22

## 2018-10-21 RX ADMIN — IBUPROFEN 800 MG: 400 TABLET ORAL at 10:27

## 2018-10-21 RX ADMIN — LEVOTHYROXINE SODIUM 137 MCG: 137 TABLET ORAL at 06:48

## 2018-10-21 RX ADMIN — DOCUSATE SODIUM 100 MG: 100 CAPSULE, LIQUID FILLED ORAL at 08:43

## 2018-10-21 NOTE — DISCHARGE SUMMARY
OB DISCHARGE SUMMARY    Pregnancy at 40w2d weeks. ,   ADMITTING DIAGNOSIS: labor  DISCHARGE DIAGNOSIS: s/p   Hemoglobin   Date Value Ref Range Status   10/13/2018 12.0 11.7 - 15.7 g/dL Final       Past Medical History:   Diagnosis Date     Pregnancy pruritus, third trimester      Thyroid disease     hypothroid     Active Problems:    Indication for care in labor or delivery     (spontaneous vaginal delivery)      PREGNANCY COMPLICATIONS: hypothyroidism, AMA  PROCEDURES:   POSTPARTUM COURSE: routine    ACTIVITIES:  Daily activities for the first week should be limited to taking care of yourself and your baby.  Nothing in vagina for 6 weeks.    DISCHARGE DIET:  Regular  Current Discharge Medication List      START taking these medications    Details   acetaminophen (TYLENOL) 325 MG tablet Take 1-2 tablets (325-650 mg) by mouth every 4 hours as needed for mild pain or fever (greater than or equal to 38  C /100.4  F (oral) or 38.5  C/ 101.4  F (core).)  Qty: 30 tablet, Refills: 0    Associated Diagnoses:  (spontaneous vaginal delivery)      ibuprofen (ADVIL/MOTRIN) 600 MG tablet Take 1 tablet (600 mg) by mouth every 6 hours as needed for other (cramping)  Qty: 30 tablet, Refills: 0    Associated Diagnoses:  (spontaneous vaginal delivery)         CONTINUE these medications which have NOT CHANGED    Details   Levothyroxine Sodium (SYNTHROID PO) Take 137 mcg by mouth daily      Prenatal Vit-Fe Fumarate-FA (PRENATAL MULTIVITAMIN PLUS IRON) 27-0.8 MG TABS per tablet Take 1 tablet by mouth daily      VITAMIN D, CHOLECALCIFEROL, PO Take 2,000 Units by mouth daily           FOLLOWUP: 6 weeks.    Balbina Perry MD

## 2018-10-21 NOTE — PLAN OF CARE
Problem: Patient Care Overview  Goal: Plan of Care/Patient Progress Review  Outcome: No Change  Breastfeeding baby with assistance, nipples flat. Using shield. Voiding without difficulty, but needs reminders to go. Ambulating in room.

## 2018-10-21 NOTE — PLAN OF CARE
Problem: Patient Care Overview  Goal: Plan of Care/Patient Progress Review  Outcome: No Change  Vss. Fundus firm, minimal bleeding. Up ad mary jane, independent with cares. Breast feeding with shield well. Discharge to home today.

## 2018-10-21 NOTE — LACTATION NOTE
Routine visit. Continues to nurse well per mom.  Infant at breast at time of visit.  Mother holding in cross cradle and using nipple shield.  Mother states she feels breast feeding is getting better.  Plans for discharge later today.  Encouraged outpatient follow up with lactation consultant in clinic as needed.  No further questions at this time.  Ilsa Back RNC-IBCLC

## 2018-10-21 NOTE — PLAN OF CARE
Problem: Patient Care Overview  Goal: Plan of Care/Patient Progress Review  Outcome: Improving  Vital signs stable. Fundus firm, scant flow. Baby breastfeeding well with shield, on demand feedings encouraged. Pain managed with tylenol & ibuprofen. Gaining independence with self and  cares. On pathway, will continue to monitor.

## 2018-10-25 ENCOUNTER — DOCUMENTATION ONLY (OUTPATIENT)
Dept: CARE COORDINATION | Facility: CLINIC | Age: 38
End: 2018-10-25

## 2018-10-25 NOTE — PROGRESS NOTES
[facility] will be sharing updates with you on Maternal Child Health Referral requests for home care services.  This is for care coordination purposes and alert you to referral status.  We received the referral for  Gina Baez; MRN [id number] and want to update you:    [facility] has made 2 attempts to contact patient by phone and text message over the last 4 days.   We have not had any response from patient.  Final message was left advising patient to follow up with Primary Care Providers for mom and baby.      Sincerely [facility]  Raghu Keller  [phone]

## 2019-07-23 ENCOUNTER — OFFICE VISIT (OUTPATIENT)
Dept: SURGERY | Facility: CLINIC | Age: 39
End: 2019-07-23
Payer: COMMERCIAL

## 2019-07-23 VITALS
BODY MASS INDEX: 30.99 KG/M2 | OXYGEN SATURATION: 97 % | HEART RATE: 78 BPM | WEIGHT: 186 LBS | SYSTOLIC BLOOD PRESSURE: 112 MMHG | DIASTOLIC BLOOD PRESSURE: 68 MMHG | RESPIRATION RATE: 16 BRPM | HEIGHT: 65 IN

## 2019-07-23 DIAGNOSIS — L72.3 SEBACEOUS CYST: Primary | ICD-10-CM

## 2019-07-23 PROCEDURE — 10060 I&D ABSCESS SIMPLE/SINGLE: CPT | Performed by: SURGERY

## 2019-07-23 PROCEDURE — 99203 OFFICE O/P NEW LOW 30 MIN: CPT | Mod: 25 | Performed by: SURGERY

## 2019-07-23 ASSESSMENT — MIFFLIN-ST. JEOR: SCORE: 1528.95

## 2019-07-23 NOTE — PROGRESS NOTES
Medication Used: Lidocaine with Epi    Amount Used: 10ml    Location:  Upper Back     Lot #: [id number]  Expiration: 12/1/2019    Jonna Navas RN-BSN

## 2019-07-23 NOTE — PROGRESS NOTES
"[facility]  Surgery Consultation    HPI:Patient is a 39 year old female who is here for consultation requested by Tierney Shaw [phone] for evaluation of back cyst.  Has been present for some time but became very painful over the last week.  Denies drainage but feels intense pressure around the area.  He has been unable to sleep due to the lesion.  No fevers or chills.  The patient also has a history of hidradenitis of her right breast and left under arm.  She has multiple chronic wounds around these areas but no active problems with them today.  She has no other complaints at this time.Patient denies fevers, chills, nausea, vomiting, SOB, chest pain, abdominal pain.    PMH:   has a past medical history of Pregnancy pruritus, third trimester and Thyroid disease.  PSH:   None  Social History:    reports that she has never smoked. She has never used smokeless tobacco. She reports that she does not drink alcohol or use drugs.  Family History: Family history personally reviewed and revealed no pertinent history.  Medications/Allergies: Home medications and allergies reviewed.    ROS:  The 10 point Review of Systems is negative other than noted in the HPI.    Physical Exam:  /68   Pulse 78   Resp 16   Ht 1.65 m (5' 4.96\")   Wt 84.4 kg (186 lb)   SpO2 97%   Breastfeeding? No   BMI 30.99 kg/m    General: Generally appears well.  Eyes- Sclera Clear- No icterus  Respiratory- Chest rise equal Bilateral  Integumentary-back-there is a 5 cm raised fluctuant mass consistent with infected sebaceous cyst.  There is surrounding erythema.  Breast- right- multiple chronic scars from small abscesses that are completely healed without drainage.  Left axilla-multiple chronic scars without active infection    Impression and Plan:  Patient is a 39 year old female with infected sebaceous cyst and hidradenitis    PLAN:   I described the pathophysiology of infected sebaceous cyst including " further work-up and management.  She also has a history of hidradenitis which is under control at this time.  I advised her to keep the area clean and dry as much as possible.  I would not recommend any acute treatment as is her are chronic in nature and likely would recur at different areas of chronic areas were excised.  She is in agreement.  I would recommend going forward with a bedside incision and drainage for her infected sebaceous cyst.  I will pack the wound today and have her take a bath or shower for at least 20 minutes twice daily to prevent the wound from closing.  She is in agreement. The risks associated with the procedure including, but not limited to, recurrence, pain, hematoma, infection, and anesthetic reaction were discussed with the patient. The patient indicated understanding of the discussion, asked appropriate questions, and provided consent.     Procedure  Consent was obtained. A surgical time out was then performed. The area was cleaned with chloraprep.  Using a sterile technique, 1 percent lidocaine with epinephrine was used to infiltrate the area. After adequate anesthesia was confirmed, a number 11 scalpel was used to make a 3 cm incision.  A large amount of purulent drainage and skin cells were removed. A hemostat was inserted to break up the loculations. The incision site was dry with no visible bleeding. The wound was then packed with iodoform dressing. Patient tolerated the  procedure well without complications.      Thank you very much for this consult.    Ricco Merrill M.D.  [facility]  [phone]    Please route or send letter to:  Primary Care Provider (PCP) and Referring Provider

## 2020-03-11 ENCOUNTER — HEALTH MAINTENANCE LETTER (OUTPATIENT)
Age: 40
End: 2020-03-11

## 2020-03-17 ENCOUNTER — TRANSFERRED RECORDS (OUTPATIENT)
Dept: HEALTH INFORMATION MANAGEMENT | Facility: CLINIC | Age: 40
End: 2020-03-17
Payer: COMMERCIAL

## 2020-03-17 LAB — TSH SERPL-ACNC: 0.47 UIU/ML (ref 0.45–4.5)

## 2020-09-22 ENCOUNTER — TRANSFERRED RECORDS (OUTPATIENT)
Dept: HEALTH INFORMATION MANAGEMENT | Facility: CLINIC | Age: 40
End: 2020-09-22
Payer: COMMERCIAL

## 2020-09-22 LAB
CHOLESTEROL (EXTERNAL): 165 MG/DL (ref 100–199)
HDLC SERPL-MCNC: 43 MG/DL
LDL CHOLESTEROL CALCULATED (EXTERNAL): 190 MG/DL (ref 0–99)
TRIGLYCERIDES (EXTERNAL): 125 MG/DL (ref 0–149)
TSH SERPL-ACNC: 0.48 UIU/ML (ref 0.45–4.5)

## 2020-12-16 LAB — TSH SERPL-ACNC: 3.5 ULU/ML (ref 0.45–4.5)

## 2021-01-03 ENCOUNTER — HEALTH MAINTENANCE LETTER (OUTPATIENT)
Age: 41
End: 2021-01-03

## 2021-02-16 LAB — TSH SERPL-ACNC: 2.72 ULU/ML (ref 0.45–4.5)

## 2021-04-25 ENCOUNTER — HEALTH MAINTENANCE LETTER (OUTPATIENT)
Age: 41
End: 2021-04-25

## 2021-06-21 LAB — TSH SERPL-ACNC: 2.46 ULU/ML (ref 0.45–4.5)

## 2021-06-22 ENCOUNTER — TRANSFERRED RECORDS (OUTPATIENT)
Dept: HEALTH INFORMATION MANAGEMENT | Facility: CLINIC | Age: 41
End: 2021-06-22
Payer: COMMERCIAL

## 2021-10-10 ENCOUNTER — HEALTH MAINTENANCE LETTER (OUTPATIENT)
Age: 41
End: 2021-10-10

## 2021-12-04 ENCOUNTER — HEALTH MAINTENANCE LETTER (OUTPATIENT)
Age: 41
End: 2021-12-04

## 2021-12-30 ENCOUNTER — TRANSFERRED RECORDS (OUTPATIENT)
Dept: HEALTH INFORMATION MANAGEMENT | Facility: CLINIC | Age: 41
End: 2021-12-30
Payer: COMMERCIAL

## 2021-12-30 LAB
HBA1C MFR BLD: 5.5 % (ref 4.8–5.6)
HPV ABSTRACT: NORMAL
PAP-ABSTRACT: NORMAL
TSH SERPL-ACNC: 12.4 UIU/ML (ref 0.45–4.5)

## 2022-01-04 ENCOUNTER — TRANSCRIBE ORDERS (OUTPATIENT)
Dept: OTHER | Age: 42
End: 2022-01-04

## 2022-01-04 DIAGNOSIS — E03.9 HYPOTHYROID: Primary | ICD-10-CM

## 2022-02-24 LAB — TSH SERPL-ACNC: 1.67 UIU/ML (ref 0.45–4.5)

## 2022-02-25 ENCOUNTER — TRANSFERRED RECORDS (OUTPATIENT)
Dept: HEALTH INFORMATION MANAGEMENT | Facility: CLINIC | Age: 42
End: 2022-02-25
Payer: COMMERCIAL

## 2022-05-01 ASSESSMENT — ENCOUNTER SYMPTOMS
CONSTIPATION: 1
RECTAL PAIN: 1
BLOOD IN STOOL: 0
DIARRHEA: 0
HEARTBURN: 0
NAUSEA: 0
BOWEL INCONTINENCE: 0
VOMITING: 0
JAUNDICE: 0
ABDOMINAL PAIN: 0
BLOATING: 0

## 2022-05-04 ENCOUNTER — OFFICE VISIT (OUTPATIENT)
Dept: ENDOCRINOLOGY | Facility: CLINIC | Age: 42
End: 2022-05-04
Attending: OBSTETRICS & GYNECOLOGY
Payer: COMMERCIAL

## 2022-05-04 VITALS
OXYGEN SATURATION: 99 % | TEMPERATURE: 98.4 F | RESPIRATION RATE: 16 BRPM | WEIGHT: 162.4 LBS | HEART RATE: 86 BPM | DIASTOLIC BLOOD PRESSURE: 72 MMHG | SYSTOLIC BLOOD PRESSURE: 92 MMHG | BODY MASS INDEX: 26.1 KG/M2 | HEIGHT: 66 IN

## 2022-05-04 DIAGNOSIS — E03.9 HYPOTHYROIDISM, UNSPECIFIED TYPE: Primary | ICD-10-CM

## 2022-05-04 PROCEDURE — 86376 MICROSOMAL ANTIBODY EACH: CPT | Performed by: INTERNAL MEDICINE

## 2022-05-04 PROCEDURE — 99204 OFFICE O/P NEW MOD 45 MIN: CPT | Performed by: INTERNAL MEDICINE

## 2022-05-04 PROCEDURE — 36415 COLL VENOUS BLD VENIPUNCTURE: CPT | Performed by: INTERNAL MEDICINE

## 2022-05-04 PROCEDURE — 84443 ASSAY THYROID STIM HORMONE: CPT | Performed by: INTERNAL MEDICINE

## 2022-05-04 PROCEDURE — 84439 ASSAY OF FREE THYROXINE: CPT | Performed by: INTERNAL MEDICINE

## 2022-05-04 RX ORDER — LEVOTHYROXINE SODIUM 112 UG/1
112 TABLET ORAL DAILY
Qty: 90 TABLET | Refills: 3 | COMMUNITY
Start: 2022-05-04 | End: 2022-05-05

## 2022-05-04 RX ORDER — LEVOTHYROXINE SODIUM 112 UG/1
TABLET ORAL
COMMUNITY
Start: 2022-02-27 | End: 2022-05-05

## 2022-05-04 NOTE — PATIENT INSTRUCTIONS
[facility]  Dr Coker, Endocrinology Department    [facility]   [address] [id number]  [place] [address]  Appointment [phone]  Fax: [phone]  [place]: Monday - Thursday      Please check the cost coverage and copay with insurance before recommended tests, services and medications (especially if new medications are prescribed).     If ordered, please get blood work done 1 week prior to your next appointment so they will be available to Dr. Coker at your visit.      Labs today.  Thyroid dose adjustment based on labs.    Take Levothyroxine on an empty stomach. Take it with a full glass of water at least 30 minutes to 1 hour before eating breakfast.   This medicine should be taken at least 4 hours before or 4 hours after these medicines: antacids (Maalox , Mylanta , Tums ), calcium supplements, cholestyramine (Prevalite , Questran ), colestipol (Colestid ), iron supplements, orlistat (Kaushik , Xenical ), simethicone (Gas-X , Mylicon ), and sucralfate (Carafate ).   Swallow the capsule whole. Do not cut or crush it.

## 2022-05-04 NOTE — LETTER
5/4/2022         RE: Gina Baez  [address] [place] [address]        Dear Colleague,    Thank you for referring your patient, Gina Baez, to the [facility]. Please see a copy of my visit note below.    ENDOCRINOLOGY CLINIC NOTE:    Name: Gina Baez  Seen in consultation with Tierney Shaw for Hypothyroidism.  HPI:  Gina Baez is a 41 year old female who presents for the evaluation of above.    #1 Hypothyroidism.  She was diagnosed in 2012 (in [place]) when she was trying to conceive.  She was started on levothyroxine 50 mcg at that time.  [place]    She was pregnant in 2018. During pregnancy her dose was gradually increased to 112 mcg/day.  Had baby in October 2018.    Currently taking levothyroxine 112 mcg/day.  On this dose since 2018.  Takes generic.  Reports compliance.    Trying to eat healthy.  Is exercising.  Lost about 2-3 labs.  Weight is mostly stable.    Today she is wondering if it is possible to get thyroid in good range without medication with lifestyle changes.  She is also has questions if we can decrease dose of levothyroxine with lifestyle changes.    Feeling OK.  Energy is OK.    Palpitations:  No  Changes to hair or skin: No  Diarrhea/Constipation: + constipation  Changes in menses: No. No plans for pregnancy at this time.  Dysphagia or Shortness of breath:No  Muscle aches or pain:No  Tremors:No  Difficulty sleeping:No  Changes in weight: No  Heat or cold intolerance: No  History of Lithium or Amiodarone use:No  Head or neck surgery/radiation:No  Family History of Thyroid Problems: No  PMH/PSH:  Past Medical History:   Diagnosis Date     Pregnancy pruritus, third trimester      Thyroid disease     hypothroid     History reviewed. No pertinent surgical history.  Family Hx:  History reviewed. No pertinent family history.    Social Hx:  Social History     Socioeconomic History     Marital status:  "     Spouse name: Not on file     Number of children: Not on file     Years of education: Not on file     Highest education level: Not on file   Occupational History     Not on file   Tobacco Use     Smoking status: Never Smoker     Smokeless tobacco: Never Used   Substance and Sexual Activity     Alcohol use: No     Drug use: No     Sexual activity: Yes     Partners: Male     Birth control/protection: None   Other Topics Concern     Not on file   Social History Narrative     Not on file     Social Determinants of Health     Financial Resource Strain: Not on file   Food Insecurity: Not on file   Transportation Needs: Not on file   Physical Activity: Not on file   Stress: Not on file   Social Connections: Not on file   Intimate Partner Violence: Not on file   Housing Stability: Not on file          MEDICATIONS:  has a current medication list which includes the following prescription(s): levothyroxine.    ROS   ROS: 10 point ROS neg other than the symptoms noted above in the HPI.    Physical Exam   VS: BP 92/72 (BP Location: Left arm, Patient Position: Chair, Cuff Size: Adult Large)   Pulse 86   Temp 98.4  F (36.9  C) (Tympanic)   Resp 16   Ht 1.676 m (5' 6\")   Wt 73.7 kg (162 lb 6.4 oz)   LMP 04/21/2022   SpO2 99%   Breastfeeding No   BMI 26.21 kg/m    GENERAL: healthy, alert and no distress  EYES: Eyes grossly normal to inspection, conjunctivae and sclerae normal  ENT: no nose swelling, nasal discharge.  Thyroid: no apparent thyroid nodules.  Thyroid appears normal in size and nontender.  CV: RRR, no rubs, gallops, no murmurs  RESP: CTAB, no wheezes, rales, or ronchi  ABDO: +BS  EXTREMITIES: no hand tremors.  NEURO: Cranial nerves grossly intact, mentation intact and speech normal  SKIN: No apparent skin lesions, rash or edema seen   PSYCH: mentation appears normal, affect normal/bright, judgement and insight intact, normal speech and appearance well-groomed      LABS:  TFTs:  2/24/2020-TSH " 1.67    She was getting care at outside clinic. Records from outside clinic reviewed personally.  She was getting care at [facility] in [place].    All pertinent notes, labs, and images personally reviewed by me.     A/P  Ms.Prajakta Will Baez is a 41 year old here for the evaluation of hypothyroidism:    #1 Hypothyroidism. Differential includes: autoimmune disease (Hashimoto's thyroiditis), treatment for hyperthyroidism, radiation therapy, thyroid surgery, medications, congenital disease, pituitary disorder, pregnancy, and iodine deficiency.  Persons with Hashimoto's thyroiditis have serum antibodies reacting with TG, TPO, and against an unidentified protein present in colloid.   Currently she is taking levothyroxine 112 mcg/day and on this dose since 2018.  Clinically no major concerns.  Records from outside clinic showed normal TSH done 3/2022.  Weight is stable.  She is not planning pregnancy.  Plan:  Discussed diagnosis, pathophysiology, management and treatment options of condition with pt.  Recommend repeat labs today.  Will also screen for Hashimoto.  Dose adjustment based on that.  I discussed with patient that as long as thyroid labs are in acceptable range dose changes not indicated.  As noted above she is questioning if she can decrease the dose of levothyroxine-again discussed with patient that if the labs are indicating dose adjustment that will be done.  Results of diet-recommend well-balanced diet.  Discussed close monitoring and possible dose adjustment in case of significant weight changes.      Discussed s/s of hypothyroidism and hyperthyroidism to watch for.  The patient indicates understanding of these issues and agrees with the plan.      Follow-up:  Based on labs.    Nereyda Coker MD  Endocrinology  [facility]  CC: Tierney Shaw      All questions were answered.  The patient indicates understanding of the above issues and agrees with the plan  set forth.           Again, thank you for allowing me to participate in the care of your patient.        Sincerely,        Nereyda Coker MD

## 2022-05-04 NOTE — PROGRESS NOTES
ENDOCRINOLOGY CLINIC NOTE:    Name: Gina Baez  Seen in consultation with Tierney Shaw for Hypothyroidism.  HPI:  Gina Baez is a 41 year old female who presents for the evaluation of above.    #1 Hypothyroidism.  She was diagnosed in 2012 (in [place]) when she was trying to conceive.  She was started on levothyroxine 50 mcg at that time.  [place]    She was pregnant in 2018. During pregnancy her dose was gradually increased to 112 mcg/day.  Had baby in October 2018.    Currently taking levothyroxine 112 mcg/day.  On this dose since 2018.  Takes generic.  Reports compliance.    Trying to eat healthy.  Is exercising.  Lost about 2-3 labs.  Weight is mostly stable.    Today she is wondering if it is possible to get thyroid in good range without medication with lifestyle changes.  She is also has questions if we can decrease dose of levothyroxine with lifestyle changes.    Feeling OK.  Energy is OK.    Palpitations:  No  Changes to hair or skin: No  Diarrhea/Constipation: + constipation  Changes in menses: No. No plans for pregnancy at this time.  Dysphagia or Shortness of breath:No  Muscle aches or pain:No  Tremors:No  Difficulty sleeping:No  Changes in weight: No  Heat or cold intolerance: No  History of Lithium or Amiodarone use:No  Head or neck surgery/radiation:No  Family History of Thyroid Problems: No  PMH/PSH:  Past Medical History:   Diagnosis Date     Pregnancy pruritus, third trimester      Thyroid disease     hypothroid     History reviewed. No pertinent surgical history.  Family Hx:  History reviewed. No pertinent family history.    Social Hx:  Social History     Socioeconomic History     Marital status:      Spouse name: Not on file     Number of children: Not on file     Years of education: Not on file     Highest education level: Not on file   Occupational History     Not on file   Tobacco Use     Smoking status: Never Smoker     Smokeless tobacco:  "Never Used   Substance and Sexual Activity     Alcohol use: No     Drug use: No     Sexual activity: Yes     Partners: Male     Birth control/protection: None   Other Topics Concern     Not on file   Social History Narrative     Not on file     Social Determinants of Health     Financial Resource Strain: Not on file   Food Insecurity: Not on file   Transportation Needs: Not on file   Physical Activity: Not on file   Stress: Not on file   Social Connections: Not on file   Intimate Partner Violence: Not on file   Housing Stability: Not on file          MEDICATIONS:  has a current medication list which includes the following prescription(s): levothyroxine.    ROS   ROS: 10 point ROS neg other than the symptoms noted above in the HPI.    Physical Exam   VS: BP 92/72 (BP Location: Left arm, Patient Position: Chair, Cuff Size: Adult Large)   Pulse 86   Temp 98.4  F (36.9  C) (Tympanic)   Resp 16   Ht 1.676 m (5' 6\")   Wt 73.7 kg (162 lb 6.4 oz)   LMP 04/21/2022   SpO2 99%   Breastfeeding No   BMI 26.21 kg/m    GENERAL: healthy, alert and no distress  EYES: Eyes grossly normal to inspection, conjunctivae and sclerae normal  ENT: no nose swelling, nasal discharge.  Thyroid: no apparent thyroid nodules.  Thyroid appears normal in size and nontender.  CV: RRR, no rubs, gallops, no murmurs  RESP: CTAB, no wheezes, rales, or ronchi  ABDO: +BS  EXTREMITIES: no hand tremors.  NEURO: Cranial nerves grossly intact, mentation intact and speech normal  SKIN: No apparent skin lesions, rash or edema seen   PSYCH: mentation appears normal, affect normal/bright, judgement and insight intact, normal speech and appearance well-groomed      LABS:  TFTs:  2/24/2020-TSH 1.67    She was getting care at outside clinic. Records from outside clinic reviewed personally.  She was getting care at [facility] in [place].    All pertinent notes, labs, and images personally reviewed by me.     A/P  Ms.Prajakta Will Baez is a 41 " year old here for the evaluation of hypothyroidism:    #1 Hypothyroidism. Differential includes: autoimmune disease (Hashimoto's thyroiditis), treatment for hyperthyroidism, radiation therapy, thyroid surgery, medications, congenital disease, pituitary disorder, pregnancy, and iodine deficiency.  Persons with Hashimoto's thyroiditis have serum antibodies reacting with TG, TPO, and against an unidentified protein present in colloid.   Currently she is taking levothyroxine 112 mcg/day and on this dose since 2018.  Clinically no major concerns.  Records from outside clinic showed normal TSH done 3/2022.  Weight is stable.  She is not planning pregnancy.  Plan:  Discussed diagnosis, pathophysiology, management and treatment options of condition with pt.  Recommend repeat labs today.  Will also screen for Hashimoto.  Dose adjustment based on that.  I discussed with patient that as long as thyroid labs are in acceptable range dose changes not indicated.  As noted above she is questioning if she can decrease the dose of levothyroxine-again discussed with patient that if the labs are indicating dose adjustment that will be done.  Results of diet-recommend well-balanced diet.  Discussed close monitoring and possible dose adjustment in case of significant weight changes.      Discussed s/s of hypothyroidism and hyperthyroidism to watch for.  The patient indicates understanding of these issues and agrees with the plan.      Follow-up:  Based on labs.    Nereyda Coker MD  Endocrinology  [facility]  CC: Tierney Shaw      All questions were answered.  The patient indicates understanding of the above issues and agrees with the plan set forth.

## 2022-05-05 ENCOUNTER — TELEPHONE (OUTPATIENT)
Dept: ENDOCRINOLOGY | Facility: CLINIC | Age: 42
End: 2022-05-05
Payer: COMMERCIAL

## 2022-05-05 DIAGNOSIS — E03.9 HYPOTHYROIDISM, UNSPECIFIED TYPE: Primary | ICD-10-CM

## 2022-05-05 LAB
T4 FREE SERPL-MCNC: 1.67 NG/DL (ref 0.76–1.46)
THYROPEROXIDASE AB SERPL-ACNC: 966 IU/ML
TSH SERPL DL<=0.005 MIU/L-ACNC: 1.53 MU/L (ref 0.4–4)

## 2022-05-05 RX ORDER — LEVOTHYROXINE SODIUM 100 UG/1
100 TABLET ORAL DAILY
Qty: 90 TABLET | Refills: 0 | Status: SHIPPED | OUTPATIENT
Start: 2022-05-05 | End: 2022-07-13

## 2022-05-05 NOTE — TELEPHONE ENCOUNTER
ENDO THYROID LABS-[facility] Latest Ref Rng & Units 5/4/2022   TSH 0.40 - 4.00 mU/L 1.53   FREE T4 0.76 - 1.46 ng/dL 1.67 (H)   THYR PEROXIDASE WILL <35 IU/mL 966 (H)     TPO antibodies are positive. This means that you you have Hashimots thyroiditis which is associated with underactive thyroid.  Based on 5/4/2022 labs recommend to decrease levothyroxine to 100 mcg daily.(from 112 mcg/day)  Labs in 2 months.  Please make a lab appointment for blood work and follow up clinic appointment in 1 week after that to discuss results.    Please inform patient and help schedule virtual (video preferred)/ clinic visit.    OK to book in next available open B[facility] slot. Please make sure that one other BONNY slot is open on the given day. There are 2 BONNY slots/day- please keep one slot open for urgent needs.  Let me know if you have any questions.      Please note that schedule gets booked out fast and it is difficult to add on patients when schedule is full.  So it is highly advised that you make appointment ahead of time so that we can follow up on labs/imaging studies in timely manner. This will help us to serve you better.  Please call the clinic to make appropriate appointments.  Let us know if you have any questions or if you need any help with scheduling.

## 2022-05-06 ENCOUNTER — TELEPHONE (OUTPATIENT)
Dept: ENDOCRINOLOGY | Facility: CLINIC | Age: 42
End: 2022-05-06
Payer: COMMERCIAL

## 2022-05-06 NOTE — TELEPHONE ENCOUNTER
[facility] Call Center    Phone Message    May a detailed message be left on voicemail: yes     Reason for Call: Other: Pt requesting call back to discuss an appt with Dr. Coker. Pt stated Dr. Coker wanted to see the pt back in two months but first available is not until Sept. Pt stated that is too far out, pt wanting to discuss     Action Taken: Message routed to:  Other: endo

## 2022-07-05 ENCOUNTER — LAB (OUTPATIENT)
Dept: LAB | Facility: CLINIC | Age: 42
End: 2022-07-05
Payer: COMMERCIAL

## 2022-07-05 DIAGNOSIS — E03.9 HYPOTHYROIDISM, UNSPECIFIED TYPE: ICD-10-CM

## 2022-07-05 LAB
T4 FREE SERPL-MCNC: 1.59 NG/DL (ref 0.76–1.46)
TSH SERPL DL<=0.005 MIU/L-ACNC: 2.77 MU/L (ref 0.4–4)

## 2022-07-05 PROCEDURE — 84439 ASSAY OF FREE THYROXINE: CPT

## 2022-07-05 PROCEDURE — 84443 ASSAY THYROID STIM HORMONE: CPT

## 2022-07-05 PROCEDURE — 36415 COLL VENOUS BLD VENIPUNCTURE: CPT

## 2022-07-05 NOTE — RESULT ENCOUNTER NOTE
Labs results/imaging studies results noted. Will discuss in next clinic visit 7/13/22.    Here is a copy for your records.  Follow-up in endocrinology Clinic as scheduled/discussed. We will review these studies/results in further detail at that visit, but feel free to contact us with any other questions in the interim.    Please call endocrinology clinic (nurse line: [phone]) if questions.    Nereyda Coker MD

## 2022-07-13 ENCOUNTER — OFFICE VISIT (OUTPATIENT)
Dept: ENDOCRINOLOGY | Facility: CLINIC | Age: 42
End: 2022-07-13
Payer: COMMERCIAL

## 2022-07-13 VITALS
HEART RATE: 101 BPM | BODY MASS INDEX: 25.52 KG/M2 | RESPIRATION RATE: 18 BRPM | OXYGEN SATURATION: 99 % | SYSTOLIC BLOOD PRESSURE: 99 MMHG | DIASTOLIC BLOOD PRESSURE: 64 MMHG | WEIGHT: 158.8 LBS | HEIGHT: 66 IN | TEMPERATURE: 97.6 F

## 2022-07-13 DIAGNOSIS — E06.3 HYPOTHYROIDISM DUE TO HASHIMOTO'S THYROIDITIS: Primary | ICD-10-CM

## 2022-07-13 PROCEDURE — 99214 OFFICE O/P EST MOD 30 MIN: CPT | Performed by: INTERNAL MEDICINE

## 2022-07-13 RX ORDER — LEVOTHYROXINE SODIUM 88 UG/1
88 TABLET ORAL DAILY
Qty: 90 TABLET | Refills: 1 | Status: SHIPPED | OUTPATIENT
Start: 2022-07-13 | End: 2022-09-21

## 2022-07-13 ASSESSMENT — ENCOUNTER SYMPTOMS
WEAKNESS: 0
LOSS OF CONSCIOUSNESS: 0
DIFFICULTY URINATING: 0
DECREASED LIBIDO: 0
HEADACHES: 0
TREMORS: 0
FLANK PAIN: 0
DISTURBANCES IN COORDINATION: 0
HOT FLASHES: 0
PARALYSIS: 0
SEIZURES: 0
SPEECH CHANGE: 0
HEMATURIA: 0
TINGLING: 0
NUMBNESS: 0
DIZZINESS: 1
DYSURIA: 0
MEMORY LOSS: 1

## 2022-07-13 NOTE — PATIENT INSTRUCTIONS
[facility]  Dr Coker, Endocrinology Department    [place] [address] [id number]  [place] [address]  Appointment [phone]  Fax: [phone]  [place]: Monday - Thursday      Decrease levothyroxine to 88 mcg/day.  Labs in 2 months.  Please make a lab appointment for blood work and follow up clinic appointment in 1 week after that to discuss results.    Take Levothyroxine on an empty stomach. Take it with a full glass of water at least 30 minutes to 1 hour before eating breakfast.   This medicine should be taken at least 4 hours before or 4 hours after these medicines: antacids (Maalox , Mylanta , Tums ), calcium supplements, cholestyramine (Prevalite , Questran ), colestipol (Colestid ), iron supplements, orlistat (Kaushik , Xenical ), simethicone (Gas-X , Mylicon ), and sucralfate (Carafate ).   Swallow the capsule whole. Do not cut or crush it.

## 2022-07-13 NOTE — LETTER
7/13/2022         RE: Gina Baez  [address] [place] [address]        Dear Colleague,    Thank you for referring your patient, Gina Baez, to the [facility]. Please see a copy of my visit note below.    ENDOCRINOLOGY CLINIC NOTE:    Name: Gina Baez  Seen in consultation with Tierney Shaw for Hypothyroidism.  HPI:  Gina Baez is a 41 year old female who presents for the evaluation of above.    #1 Hypothyroidism.  She was diagnosed in 2012 (in [place]) when she was trying to conceive.  She was started on levothyroxine 50 mcg at that time.  [place]    She was pregnant in 2018. During pregnancy her dose was gradually increased to 112 mcg/day.  Had baby in October 2018.    Currently taking levothyroxine 100 mcg/day.  On this dose since 5/2022.  Takes generic.  Reports compliance.    Trying to eat healthy.  Is exercising.  Lost about 2-3 labs.  Weight is mostly stable.    Wt Readings from Last 2 Encounters:   07/13/22 72 kg (158 lb 12.8 oz)   05/04/22 73.7 kg (162 lb 6.4 oz)     Today she is wondering if it is possible to get thyroid in good range without medication with lifestyle changes.  She is also has questions if we can decrease dose of levothyroxine with lifestyle changes.    Feeling OK.  Energy is OK.  + urinary urgency.  Water intake is normal.    Palpitations:  No  Changes to hair or skin: No  Diarrhea/Constipation: No  Changes in menses: No. No plans for pregnancy at this time.  Dysphagia or Shortness of breath:No  Muscle aches or pain:No  Tremors:No  Difficulty sleeping:No  Changes in weight: + wt loss as noted above. Had COVID X 1 month back.  Heat or cold intolerance: No  History of Lithium or Amiodarone use:No  Head or neck surgery/radiation:No  Family History of Thyroid Problems: No  PMH/PSH:  Past Medical History:   Diagnosis Date     Pregnancy pruritus, third trimester      Thyroid disease      "hypothroid     History reviewed. No pertinent surgical history.  Family Hx:  History reviewed. No pertinent family history.    Social Hx:  Social History     Socioeconomic History     Marital status:      Spouse name: Not on file     Number of children: Not on file     Years of education: Not on file     Highest education level: Not on file   Occupational History     Not on file   Tobacco Use     Smoking status: Never Smoker     Smokeless tobacco: Never Used   Vaping Use     Vaping Use: Never used   Substance and Sexual Activity     Alcohol use: No     Drug use: No     Sexual activity: Yes     Partners: Male     Birth control/protection: None   Other Topics Concern     Not on file   Social History Narrative     Not on file     Social Determinants of Health     Financial Resource Strain: Not on file   Food Insecurity: Not on file   Transportation Needs: Not on file   Physical Activity: Not on file   Stress: Not on file   Social Connections: Not on file   Intimate Partner Violence: Not on file   Housing Stability: Not on file          MEDICATIONS:  has a current medication list which includes the following prescription(s): levothyroxine.    ROS   ROS: 10 point ROS neg other than the symptoms noted above in the HPI.    Physical Exam   VS: Resp 18   Ht 1.676 m (5' 6\")   BMI 26.21 kg/m    GENERAL: healthy, alert and no distress  EYES: Eyes grossly normal to inspection, conjunctivae and sclerae normal  ENT: no nose swelling, nasal discharge.  Thyroid: no apparent thyroid nodules.  Thyroid appears normal in size and nontender.  CV: RRR, no rubs, gallops, no murmurs  RESP: CTAB, no wheezes, rales, or ronchi  ABDO: +BS  EXTREMITIES: no hand tremors.  NEURO: Cranial nerves grossly intact, mentation intact and speech normal  SKIN: No apparent skin lesions, rash or edema seen   PSYCH: mentation appears normal, affect normal/bright, judgement and insight intact, normal speech and appearance " well-groomed      LABS:  TFTs:  2/24/2020-TSH 1.67    ENDO THYROID LABS-[facility] Latest Ref Rng & Units 7/5/2022 5/4/2022   TSH 0.40 - 4.00 mU/L 2.77 1.53   TSH (EXTERNAL) 0.450 - 4.500 uIU/ml     FREE T4 0.76 - 1.46 ng/dL 1.59 (H) 1.67 (H)   THYR PEROXIDASE WILL <35 IU/mL  966 (H)     She was getting care at outside clinic. Records from outside clinic reviewed personally.  She was getting care at [facility] in [place].    All pertinent notes, labs, and images personally reviewed by me.     A/P  Ms.Prajakta Will Baez is a 41 year old here for the evaluation of hypothyroidism:    #1 Hypothyroidism(+TPO):  ( chronic medical condition with unstable labs or progression requiring medication dose adjustment)  Persons with Hashimoto's thyroiditis have serum antibodies reacting with TG, TPO, and against an unidentified protein present in colloid.   Currently she is taking levothyroxine 100  mcg/day and on this dose since 5/2022.  Clinically no major concerns.  She is not planning pregnancy.  Plan:  Discussed diagnosis, pathophysiology, management and treatment options of condition with pt.  Decrease levothyroxine to 88 mcg/day.  Labs in 2 months.  Please make a lab appointment for blood work and follow up clinic appointment in 1 week after that to discuss results.    Discussed s/s of hypothyroidism and hyperthyroidism to watch for.  The patient indicates understanding of these issues and agrees with the plan.      Follow-up:  2 months.    Nereyda Coker MD  Endocrinology  [facility]  CC: Tierney Shaw      All questions were answered.  The patient indicates understanding of the above issues and agrees with the plan set forth.       Answers for HPI/ROS submitted by the patient on 7/13/2022  General Symptoms: No  Skin Symptoms: No  HENT Symptoms: No  EYE SYMPTOMS: No  HEART SYMPTOMS: No  LUNG SYMPTOMS: No  INTESTINAL SYMPTOMS: No  URINARY SYMPTOMS: Yes  GYNECOLOGIC SYMPTOMS:  Yes  BREAST SYMPTOMS: No  SKELETAL SYMPTOMS: No  BLOOD SYMPTOMS: No  NERVOUS SYSTEM SYMPTOMS: Yes  MENTAL HEALTH SYMPTOMS: No  Trouble holding urine or incontinence: No  Pain or burning: No  Trouble starting or stopping: No  Increased frequency of urination: Yes  Blood in urine: No  Decreased frequency of urination: No  Frequent nighttime urination: No  Flank pain: No  Difficulty emptying bladder: No  Bleeding or spotting between periods: No  Heavy or painful periods: No  Irregular periods: Yes  Vaginal discharge: No  Hot flashes: No  Vaginal dryness: No  Genital ulcers: No  Reduced libido: No  Painful intercourse: No  Difficulty with sexual arousal: No  Post-menopausal bleeding: No  Trouble with coordination: No  Dizziness or trouble with balance: Yes  Fainting or black-out spells: No  Memory loss: Yes  Headache: No  Seizures: No  Speech problems: No  Tingling: No  Tremor: No  Weakness: No  Difficulty walking: No  Paralysis: No  Numbness: No          Again, thank you for allowing me to participate in the care of your patient.        Sincerely,        Nereyda Coker MD

## 2022-07-13 NOTE — PROGRESS NOTES
ENDOCRINOLOGY CLINIC NOTE:    Name: Gina Baez  Seen in consultation with Tierney Shaw for Hypothyroidism.  HPI:  Gina Baez is a 41 year old female who presents for the evaluation of above.    #1 Hypothyroidism.  She was diagnosed in 2012 (in [place]) when she was trying to conceive.  She was started on levothyroxine 50 mcg at that time.  [place]    She was pregnant in 2018. During pregnancy her dose was gradually increased to 112 mcg/day.  Had baby in October 2018.    Currently taking levothyroxine 100 mcg/day.  On this dose since 5/2022.  Takes generic.  Reports compliance.    Trying to eat healthy.  Is exercising.  Lost about 2-3 labs.  Weight is mostly stable.    Wt Readings from Last 2 Encounters:   07/13/22 72 kg (158 lb 12.8 oz)   05/04/22 73.7 kg (162 lb 6.4 oz)     Today she is wondering if it is possible to get thyroid in good range without medication with lifestyle changes.  She is also has questions if we can decrease dose of levothyroxine with lifestyle changes.    Feeling OK.  Energy is OK.  + urinary urgency.  Water intake is normal.    Palpitations:  No  Changes to hair or skin: No  Diarrhea/Constipation: No  Changes in menses: No. No plans for pregnancy at this time.  Dysphagia or Shortness of breath:No  Muscle aches or pain:No  Tremors:No  Difficulty sleeping:No  Changes in weight: + wt loss as noted above. Had COVID X 1 month back.  Heat or cold intolerance: No  History of Lithium or Amiodarone use:No  Head or neck surgery/radiation:No  Family History of Thyroid Problems: No  PMH/PSH:  Past Medical History:   Diagnosis Date     Pregnancy pruritus, third trimester      Thyroid disease     hypothroid     History reviewed. No pertinent surgical history.  Family Hx:  History reviewed. No pertinent family history.    Social Hx:  Social History     Socioeconomic History     Marital status:      Spouse name: Not on file     Number of children: Not on  "file     Years of education: Not on file     Highest education level: Not on file   Occupational History     Not on file   Tobacco Use     Smoking status: Never Smoker     Smokeless tobacco: Never Used   Vaping Use     Vaping Use: Never used   Substance and Sexual Activity     Alcohol use: No     Drug use: No     Sexual activity: Yes     Partners: Male     Birth control/protection: None   Other Topics Concern     Not on file   Social History Narrative     Not on file     Social Determinants of Health     Financial Resource Strain: Not on file   Food Insecurity: Not on file   Transportation Needs: Not on file   Physical Activity: Not on file   Stress: Not on file   Social Connections: Not on file   Intimate Partner Violence: Not on file   Housing Stability: Not on file          MEDICATIONS:  has a current medication list which includes the following prescription(s): levothyroxine.    ROS   ROS: 10 point ROS neg other than the symptoms noted above in the HPI.    Physical Exam   VS: Resp 18   Ht 1.676 m (5' 6\")   BMI 26.21 kg/m    GENERAL: healthy, alert and no distress  EYES: Eyes grossly normal to inspection, conjunctivae and sclerae normal  ENT: no nose swelling, nasal discharge.  Thyroid: no apparent thyroid nodules.  Thyroid appears normal in size and nontender.  CV: RRR, no rubs, gallops, no murmurs  RESP: CTAB, no wheezes, rales, or ronchi  ABDO: +BS  EXTREMITIES: no hand tremors.  NEURO: Cranial nerves grossly intact, mentation intact and speech normal  SKIN: No apparent skin lesions, rash or edema seen   PSYCH: mentation appears normal, affect normal/bright, judgement and insight intact, normal speech and appearance well-groomed      LABS:  TFTs:  2/24/2020-TSH 1.67    ENDO THYROID LABS-[facility] Latest Ref Rng & Units 7/5/2022 5/4/2022   TSH 0.40 - 4.00 mU/L 2.77 1.53   TSH (EXTERNAL) 0.450 - 4.500 uIU/ml     FREE T4 0.76 - 1.46 ng/dL 1.59 (H) 1.67 (H)   THYR PEROXIDASE WILL <35 IU/mL  966 (H)     She was getting " care at outside clinic. Records from outside clinic reviewed personally.  She was getting care at [facility] in [place].    All pertinent notes, labs, and images personally reviewed by me.     A/P  Ms.Prajakta Will Baez is a 41 year old here for the evaluation of hypothyroidism:    #1 Hypothyroidism(+TPO):  ( chronic medical condition with unstable labs or progression requiring medication dose adjustment)  Persons with Hashimoto's thyroiditis have serum antibodies reacting with TG, TPO, and against an unidentified protein present in colloid.   Currently she is taking levothyroxine 100  mcg/day and on this dose since 5/2022.  Clinically no major concerns.  She is not planning pregnancy.  Plan:  Discussed diagnosis, pathophysiology, management and treatment options of condition with pt.  Decrease levothyroxine to 88 mcg/day.  Labs in 2 months.  Please make a lab appointment for blood work and follow up clinic appointment in 1 week after that to discuss results.    Discussed s/s of hypothyroidism and hyperthyroidism to watch for.  The patient indicates understanding of these issues and agrees with the plan.      Follow-up:  2 months.    Nereyda Coker MD  Endocrinology  [facility]  CC: Tierney Shaw      All questions were answered.  The patient indicates understanding of the above issues and agrees with the plan set forth.       Answers for HPI/ROS submitted by the patient on 7/13/2022  General Symptoms: No  Skin Symptoms: No  HENT Symptoms: No  EYE SYMPTOMS: No  HEART SYMPTOMS: No  LUNG SYMPTOMS: No  INTESTINAL SYMPTOMS: No  URINARY SYMPTOMS: Yes  GYNECOLOGIC SYMPTOMS: Yes  BREAST SYMPTOMS: No  SKELETAL SYMPTOMS: No  BLOOD SYMPTOMS: No  NERVOUS SYSTEM SYMPTOMS: Yes  MENTAL HEALTH SYMPTOMS: No  Trouble holding urine or incontinence: No  Pain or burning: No  Trouble starting or stopping: No  Increased frequency of urination: Yes  Blood in urine: No  Decreased frequency  of urination: No  Frequent nighttime urination: No  Flank pain: No  Difficulty emptying bladder: No  Bleeding or spotting between periods: No  Heavy or painful periods: No  Irregular periods: Yes  Vaginal discharge: No  Hot flashes: No  Vaginal dryness: No  Genital ulcers: No  Reduced libido: No  Painful intercourse: No  Difficulty with sexual arousal: No  Post-menopausal bleeding: No  Trouble with coordination: No  Dizziness or trouble with balance: Yes  Fainting or black-out spells: No  Memory loss: Yes  Headache: No  Seizures: No  Speech problems: No  Tingling: No  Tremor: No  Weakness: No  Difficulty walking: No  Paralysis: No  Numbness: No

## 2022-07-14 ENCOUNTER — TELEPHONE (OUTPATIENT)
Dept: ENDOCRINOLOGY | Facility: CLINIC | Age: 42
End: 2022-07-14

## 2022-07-14 NOTE — TELEPHONE ENCOUNTER
[facility] Call Center    Phone Message    May a detailed message be left on voicemail: yes     Reason for Call: Other: Pt stated she has appt 9/7/22 and will not be able to make it. Pt did not want to reschedule out into December and requested a call or mychart message from care team. Please advise. Thank you     Action Taken: Message routed to:  Other: endo    Travel Screening: Not Applicable

## 2022-07-14 NOTE — TELEPHONE ENCOUNTER
LOV:7/14/22    Per notes Follow-up:  2 months.    Pt canceled appt on 9/7/22.     Please advise if okay to use BONNY with 2 spots open

## 2022-09-18 ENCOUNTER — HEALTH MAINTENANCE LETTER (OUTPATIENT)
Age: 42
End: 2022-09-18

## 2022-09-19 ENCOUNTER — LAB (OUTPATIENT)
Dept: LAB | Facility: CLINIC | Age: 42
End: 2022-09-19
Payer: COMMERCIAL

## 2022-09-19 DIAGNOSIS — E06.3 HYPOTHYROIDISM DUE TO HASHIMOTO'S THYROIDITIS: ICD-10-CM

## 2022-09-19 LAB — HBA1C MFR BLD: 5.6 % (ref 0–5.6)

## 2022-09-19 PROCEDURE — 83036 HEMOGLOBIN GLYCOSYLATED A1C: CPT

## 2022-09-19 PROCEDURE — 84443 ASSAY THYROID STIM HORMONE: CPT

## 2022-09-19 PROCEDURE — 99000 SPECIMEN HANDLING OFFICE-LAB: CPT

## 2022-09-19 PROCEDURE — 36415 COLL VENOUS BLD VENIPUNCTURE: CPT

## 2022-09-19 PROCEDURE — 84439 ASSAY OF FREE THYROXINE: CPT | Mod: 90

## 2022-09-20 LAB
T4 FREE SERPL-MCNC: 1.34 NG/DL (ref 0.76–1.46)
TSH SERPL DL<=0.005 MIU/L-ACNC: 5.73 MU/L (ref 0.4–4)

## 2022-09-21 ENCOUNTER — OFFICE VISIT (OUTPATIENT)
Dept: ENDOCRINOLOGY | Facility: CLINIC | Age: 42
End: 2022-09-21
Payer: COMMERCIAL

## 2022-09-21 VITALS
HEIGHT: 66 IN | RESPIRATION RATE: 16 BRPM | DIASTOLIC BLOOD PRESSURE: 69 MMHG | HEART RATE: 78 BPM | BODY MASS INDEX: 26.15 KG/M2 | WEIGHT: 162.7 LBS | TEMPERATURE: 98.4 F | OXYGEN SATURATION: 99 % | SYSTOLIC BLOOD PRESSURE: 112 MMHG

## 2022-09-21 DIAGNOSIS — E03.9 HYPOTHYROIDISM, UNSPECIFIED TYPE: ICD-10-CM

## 2022-09-21 DIAGNOSIS — E06.3 HYPOTHYROIDISM DUE TO HASHIMOTO'S THYROIDITIS: Primary | ICD-10-CM

## 2022-09-21 PROCEDURE — 99213 OFFICE O/P EST LOW 20 MIN: CPT | Performed by: INTERNAL MEDICINE

## 2022-09-21 RX ORDER — LEVOTHYROXINE SODIUM 88 UG/1
88 TABLET ORAL DAILY
Qty: 90 TABLET | Refills: 1 | Status: SHIPPED | OUTPATIENT
Start: 2022-09-21 | End: 2023-01-18

## 2022-09-21 NOTE — PATIENT INSTRUCTIONS
[facility]  Dr Coker, Endocrinology Department    [facility]   [address] [facility]  [place] [address]  Appointment [phone]  Fax: [phone]  [place]: Monday - Thursday      Please check the cost coverage and copay with insurance before recommended tests, services and medications (especially if new medications are prescribed).     If ordered, please get blood work done 1 week prior to your next appointment so they will be available to Dr. Coker at your visit.    Thyroid labs are in acceptable range.  Continue current dose of thyroid hormone replacement-- 88 mcg/day.  Labs in 2-3 months or when back from [facility].  Please make a lab appointment for blood work and follow up clinic appointment in 1 week after that to discuss results.    Take Levothyroxine on an empty stomach. Take it with a full glass of water at least 30 minutes to 1 hour before eating breakfast.   This medicine should be taken at least 4 hours before or 4 hours after these medicines: antacids (Maalox , Mylanta , Tums ), calcium supplements, cholestyramine (Prevalite , Questran ), colestipol (Colestid ), iron supplements, orlistat (Kaushik , Xenical ), simethicone (Gas-X , Mylicon ), and sucralfate (Carafate ).   Swallow the capsule whole. Do not cut or crush it.

## 2022-09-21 NOTE — PROGRESS NOTES
ENDOCRINOLOGY CLINIC NOTE:    Name: Gina Baez  Seen for f/u of Hypothyroidism.  HPI:  Gina Baez is a 42 year old female who presents for the evaluation of above.  She is going to [facility] next week for 2 months.    #1 Hypothyroidism (+TPO):  She was diagnosed in 2012 (in [place]) when she was trying to conceive.  She was started on levothyroxine 50 mcg at that time.  [place]    She was pregnant in 2018. During pregnancy her dose was gradually increased to 112 mcg/day.  Had baby in October 2018.    Currently taking levothyroxine  88 mcg/day.  On this dose since 7/2022.  F/u labs as noted below.    Takes generic.  Reports compliance.    Trying to eat healthy.  Is exercising.  Weight is mostly stable.    Wt Readings from Last 2 Encounters:   09/21/22 73.8 kg (162 lb 11.2 oz)   07/13/22 72 kg (158 lb 12.8 oz)     Today she is wondering if it is possible to get thyroid in good range without medication with lifestyle changes.  She is also has questions if we can decrease dose of levothyroxine with lifestyle changes.    Feeling OK.  Energy is OK.  + urinary urgency.  Water intake is normal.    Palpitations:  No  Changes to hair or skin: No  Diarrhea/Constipation: No  Changes in menses: No. No plans for pregnancy at this time.  Dysphagia or Shortness of breath:No  Muscle aches or pain:No  Tremors:No  Difficulty sleeping:No  Changes in weight: + wt loss as noted above. Had COVID X 1 month back.  Heat or cold intolerance: No  History of Lithium or Amiodarone use:No  Head or neck surgery/radiation:No  Family History of Thyroid Problems: No  PMH/PSH:  Past Medical History:   Diagnosis Date     Pregnancy pruritus, third trimester      Thyroid disease     hypothroid     History reviewed. No pertinent surgical history.  Family Hx:  History reviewed. No pertinent family history.    Social Hx:  Social History     Socioeconomic History     Marital status:      Spouse name: Not on file      "Number of children: Not on file     Years of education: Not on file     Highest education level: Not on file   Occupational History     Not on file   Tobacco Use     Smoking status: Never Smoker     Smokeless tobacco: Never Used   Vaping Use     Vaping Use: Never used   Substance and Sexual Activity     Alcohol use: No     Drug use: No     Sexual activity: Yes     Partners: Male     Birth control/protection: None   Other Topics Concern     Not on file   Social History Narrative     Not on file     Social Determinants of Health     Financial Resource Strain: Not on file   Food Insecurity: Not on file   Transportation Needs: Not on file   Physical Activity: Not on file   Stress: Not on file   Social Connections: Not on file   Intimate Partner Violence: Not on file   Housing Stability: Not on file          MEDICATIONS:  has a current medication list which includes the following prescription(s): levothyroxine.    ROS   ROS: 10 point ROS neg other than the symptoms noted above in the HPI.    Physical Exam   VS: /69 (BP Location: Left arm, Patient Position: Chair, Cuff Size: Adult Regular)   Pulse 78   Temp 98.4  F (36.9  C) (Tympanic)   Resp 16   Ht 1.676 m (5' 6\")   Wt 73.8 kg (162 lb 11.2 oz)   LMP 09/06/2022   SpO2 99%   Breastfeeding No   BMI 26.26 kg/m    GENERAL: healthy, alert and no distress  EYES: Eyes grossly normal to inspection, conjunctivae and sclerae normal  ENT: no nose swelling, nasal discharge.  Thyroid: no apparent thyroid nodules.  Thyroid appears normal in size and nontender.  CV: RRR, no rubs, gallops, no murmurs  RESP: CTAB, no wheezes, rales, or ronchi  ABDO: +BS  EXTREMITIES: no hand tremors.  NEURO: Cranial nerves grossly intact, mentation intact and speech normal  SKIN: No apparent skin lesions, rash or edema seen   PSYCH: mentation appears normal, affect normal/bright, judgement and insight intact, normal speech and appearance well-groomed      LABS:  A1c  Lab Results "   Component Value Date    A1C 5.6 09/19/2022       TFTs:  2/24/2020-TSH 1.67    ENDO THYROID LABS-[facility] Latest Ref Rng & Units 7/5/2022 5/4/2022   TSH 0.40 - 4.00 mU/L 2.77 1.53   TSH (EXTERNAL) 0.450 - 4.500 uIU/ml     FREE T4 0.76 - 1.46 ng/dL 1.59 (H) 1.67 (H)   THYR PEROXIDASE WILL <35 IU/mL  966 (H)     She was getting care at outside clinic. Records from outside clinic reviewed personally.  She was getting care at [facility] in [place].    All pertinent notes, labs, and images personally reviewed by me.     A/P  Ms.Prajakta Will Baze is a 41 year old here for the evaluation of hypothyroidism:    #1 Hypothyroidism(+TPO):  ( chronic medical condition with unstable labs or progression requiring medication dose adjustment)  Persons with Hashimoto's thyroiditis have serum antibodies reacting with TG, TPO, and against an unidentified protein present in colloid.   Currently she is taking levothyroxine 88  mcg/day and on this dose since 7/2022.  F/u labs as noted above with slightly elevated TSH 5.73 and normal free T4 1.34.  Minor weight gain since last visit.  Clinically no major concerns.  She is not planning pregnancy.  Plan:  Discussed diagnosis, pathophysiology, management and treatment options of condition with pt.  Discussed labs with her.  Based on TSH can consider to increase dose slightly.  But at this time she is not reporting major concerns and free T4 is in normal range with slightly elevated TSH so it is reasonable to continue current dose and follow TSH trend.  Plan to continue levothyroxine 88 mcg/day.  Labs in 2 months or when back from [place].  Discussed to follow-up with local provider if there are any concern when in [place] and to get thyroid labs checked.  Please make a lab appointment for blood work and follow up clinic appointment in 1 week after that to discuss results.    Discussed s/s of hypothyroidism and hyperthyroidism to watch for.  The patient indicates understanding of  these issues and agrees with the plan.      Follow-up:  2 months.    Nereyda Coker MD  Endocrinology  [facility]  CC: Tierney Shaw      All questions were answered.  The patient indicates understanding of the above issues and agrees with the plan set forth.       Answers for HPI/ROS submitted by the patient on 7/13/2022  General Symptoms: No  Skin Symptoms: No  HENT Symptoms: No  EYE SYMPTOMS: No  HEART SYMPTOMS: No  LUNG SYMPTOMS: No  INTESTINAL SYMPTOMS: No  URINARY SYMPTOMS: Yes  GYNECOLOGIC SYMPTOMS: Yes  BREAST SYMPTOMS: No  SKELETAL SYMPTOMS: No  BLOOD SYMPTOMS: No  NERVOUS SYSTEM SYMPTOMS: Yes  MENTAL HEALTH SYMPTOMS: No  Trouble holding urine or incontinence: No  Pain or burning: No  Trouble starting or stopping: No  Increased frequency of urination: Yes  Blood in urine: No  Decreased frequency of urination: No  Frequent nighttime urination: No  Flank pain: No  Difficulty emptying bladder: No  Bleeding or spotting between periods: No  Heavy or painful periods: No  Irregular periods: Yes  Vaginal discharge: No  Hot flashes: No  Vaginal dryness: No  Genital ulcers: No  Reduced libido: No  Painful intercourse: No  Difficulty with sexual arousal: No  Post-menopausal bleeding: No  Trouble with coordination: No  Dizziness or trouble with balance: Yes  Fainting or black-out spells: No  Memory loss: Yes  Headache: No  Seizures: No  Speech problems: No  Tingling: No  Tremor: No  Weakness: No  Difficulty walking: No  Paralysis: No  Numbness: No      Answers for HPI/ROS submitted by the patient on 9/21/2022  General Symptoms: No  Skin Symptoms: No  HENT Symptoms: No  EYE SYMPTOMS: No  HEART SYMPTOMS: No  LUNG SYMPTOMS: No  INTESTINAL SYMPTOMS: No  URINARY SYMPTOMS: No  GYNECOLOGIC SYMPTOMS: No  BREAST SYMPTOMS: No  SKELETAL SYMPTOMS: No  BLOOD SYMPTOMS: No  NERVOUS SYSTEM SYMPTOMS: No  MENTAL HEALTH SYMPTOMS: No

## 2022-09-21 NOTE — LETTER
9/21/2022         RE: Gina Baez  [address] [place] [address]        Dear Colleague,    Thank you for referring your patient, Gina Baez, to the [facility]. Please see a copy of my visit note below.    ENDOCRINOLOGY CLINIC NOTE:    Name: Gina Baez  Seen for f/u of Hypothyroidism.  HPI:  Gina Baez is a 42 year old female who presents for the evaluation of above.  She is going to [facility] next week for 2 months.    #1 Hypothyroidism (+TPO):  She was diagnosed in 2012 (in [place]) when she was trying to conceive.  She was started on levothyroxine 50 mcg at that time.  [place]    She was pregnant in 2018. During pregnancy her dose was gradually increased to 112 mcg/day.  Had baby in October 2018.    Currently taking levothyroxine  88 mcg/day.  On this dose since 7/2022.  F/u labs as noted below.    Takes generic.  Reports compliance.    Trying to eat healthy.  Is exercising.  Weight is mostly stable.    Wt Readings from Last 2 Encounters:   09/21/22 73.8 kg (162 lb 11.2 oz)   07/13/22 72 kg (158 lb 12.8 oz)     Today she is wondering if it is possible to get thyroid in good range without medication with lifestyle changes.  She is also has questions if we can decrease dose of levothyroxine with lifestyle changes.    Feeling OK.  Energy is OK.  + urinary urgency.  Water intake is normal.    Palpitations:  No  Changes to hair or skin: No  Diarrhea/Constipation: No  Changes in menses: No. No plans for pregnancy at this time.  Dysphagia or Shortness of breath:No  Muscle aches or pain:No  Tremors:No  Difficulty sleeping:No  Changes in weight: + wt loss as noted above. Had COVID X 1 month back.  Heat or cold intolerance: No  History of Lithium or Amiodarone use:No  Head or neck surgery/radiation:No  Family History of Thyroid Problems: No  PMH/PSH:  Past Medical History:   Diagnosis Date     Pregnancy pruritus, third trimester       "Thyroid disease     hypothroid     History reviewed. No pertinent surgical history.  Family Hx:  History reviewed. No pertinent family history.    Social Hx:  Social History     Socioeconomic History     Marital status:      Spouse name: Not on file     Number of children: Not on file     Years of education: Not on file     Highest education level: Not on file   Occupational History     Not on file   Tobacco Use     Smoking status: Never Smoker     Smokeless tobacco: Never Used   Vaping Use     Vaping Use: Never used   Substance and Sexual Activity     Alcohol use: No     Drug use: No     Sexual activity: Yes     Partners: Male     Birth control/protection: None   Other Topics Concern     Not on file   Social History Narrative     Not on file     Social Determinants of Health     Financial Resource Strain: Not on file   Food Insecurity: Not on file   Transportation Needs: Not on file   Physical Activity: Not on file   Stress: Not on file   Social Connections: Not on file   Intimate Partner Violence: Not on file   Housing Stability: Not on file          MEDICATIONS:  has a current medication list which includes the following prescription(s): levothyroxine.    ROS   ROS: 10 point ROS neg other than the symptoms noted above in the HPI.    Physical Exam   VS: /69 (BP Location: Left arm, Patient Position: Chair, Cuff Size: Adult Regular)   Pulse 78   Temp 98.4  F (36.9  C) (Tympanic)   Resp 16   Ht 1.676 m (5' 6\")   Wt 73.8 kg (162 lb 11.2 oz)   LMP 09/06/2022   SpO2 99%   Breastfeeding No   BMI 26.26 kg/m    GENERAL: healthy, alert and no distress  EYES: Eyes grossly normal to inspection, conjunctivae and sclerae normal  ENT: no nose swelling, nasal discharge.  Thyroid: no apparent thyroid nodules.  Thyroid appears normal in size and nontender.  CV: RRR, no rubs, gallops, no murmurs  RESP: CTAB, no wheezes, rales, or ronchi  ABDO: +BS  EXTREMITIES: no hand tremors.  NEURO: Cranial nerves grossly " intact, mentation intact and speech normal  SKIN: No apparent skin lesions, rash or edema seen   PSYCH: mentation appears normal, affect normal/bright, judgement and insight intact, normal speech and appearance well-groomed      LABS:  A1c  Lab Results   Component Value Date    A1C 5.6 09/19/2022       TFTs:  2/24/2020-TSH 1.67    ENDO THYROID LABS-[facility] Latest Ref Rng & Units 7/5/2022 5/4/2022   TSH 0.40 - 4.00 mU/L 2.77 1.53   TSH (EXTERNAL) 0.450 - 4.500 uIU/ml     FREE T4 0.76 - 1.46 ng/dL 1.59 (H) 1.67 (H)   THYR PEROXIDASE WILL <35 IU/mL  966 (H)     She was getting care at outside clinic. Records from outside clinic reviewed personally.  She was getting care at [facility] in [place].    All pertinent notes, labs, and images personally reviewed by me.     A/P  Ms.Prajakta Will Baez is a 41 year old here for the evaluation of hypothyroidism:    #1 Hypothyroidism(+TPO):  ( chronic medical condition with unstable labs or progression requiring medication dose adjustment)  Persons with Hashimoto's thyroiditis have serum antibodies reacting with TG, TPO, and against an unidentified protein present in colloid.   Currently she is taking levothyroxine 88  mcg/day and on this dose since 7/2022.  F/u labs as noted above with slightly elevated TSH 5.73 and normal free T4 1.34.  Minor weight gain since last visit.  Clinically no major concerns.  She is not planning pregnancy.  Plan:  Discussed diagnosis, pathophysiology, management and treatment options of condition with pt.  Discussed labs with her.  Based on TSH can consider to increase dose slightly.  But at this time she is not reporting major concerns and free T4 is in normal range with slightly elevated TSH so it is reasonable to continue current dose and follow TSH trend.  Plan to continue levothyroxine 88 mcg/day.  Labs in 2 months or when back from [place].  Discussed to follow-up with local provider if there are any concern when in [place] and to get  thyroid labs checked.  Please make a lab appointment for blood work and follow up clinic appointment in 1 week after that to discuss results.    Discussed s/s of hypothyroidism and hyperthyroidism to watch for.  The patient indicates understanding of these issues and agrees with the plan.      Follow-up:  2 months.    Nereyda Coker MD  Endocrinology  [facility]  CC: Tierney Shaw      All questions were answered.  The patient indicates understanding of the above issues and agrees with the plan set forth.       Answers for HPI/ROS submitted by the patient on 7/13/2022  General Symptoms: No  Skin Symptoms: No  HENT Symptoms: No  EYE SYMPTOMS: No  HEART SYMPTOMS: No  LUNG SYMPTOMS: No  INTESTINAL SYMPTOMS: No  URINARY SYMPTOMS: Yes  GYNECOLOGIC SYMPTOMS: Yes  BREAST SYMPTOMS: No  SKELETAL SYMPTOMS: No  BLOOD SYMPTOMS: No  NERVOUS SYSTEM SYMPTOMS: Yes  MENTAL HEALTH SYMPTOMS: No  Trouble holding urine or incontinence: No  Pain or burning: No  Trouble starting or stopping: No  Increased frequency of urination: Yes  Blood in urine: No  Decreased frequency of urination: No  Frequent nighttime urination: No  Flank pain: No  Difficulty emptying bladder: No  Bleeding or spotting between periods: No  Heavy or painful periods: No  Irregular periods: Yes  Vaginal discharge: No  Hot flashes: No  Vaginal dryness: No  Genital ulcers: No  Reduced libido: No  Painful intercourse: No  Difficulty with sexual arousal: No  Post-menopausal bleeding: No  Trouble with coordination: No  Dizziness or trouble with balance: Yes  Fainting or black-out spells: No  Memory loss: Yes  Headache: No  Seizures: No  Speech problems: No  Tingling: No  Tremor: No  Weakness: No  Difficulty walking: No  Paralysis: No  Numbness: No      Answers for HPI/ROS submitted by the patient on 9/21/2022  General Symptoms: No  Skin Symptoms: No  HENT Symptoms: No  EYE SYMPTOMS: No  HEART SYMPTOMS: No  LUNG SYMPTOMS: No  INTESTINAL  SYMPTOMS: No  URINARY SYMPTOMS: No  GYNECOLOGIC SYMPTOMS: No  BREAST SYMPTOMS: No  SKELETAL SYMPTOMS: No  BLOOD SYMPTOMS: No  NERVOUS SYSTEM SYMPTOMS: No  MENTAL HEALTH SYMPTOMS: No          Again, thank you for allowing me to participate in the care of your patient.        Sincerely,        Nereyda Coker MD

## 2022-09-23 LAB — T4 FREE SERPL DIALY-MCNC: 1.9 NG/DL

## 2023-01-11 ENCOUNTER — LAB (OUTPATIENT)
Dept: LAB | Facility: CLINIC | Age: 43
End: 2023-01-11
Payer: COMMERCIAL

## 2023-01-11 DIAGNOSIS — E03.9 HYPOTHYROIDISM, UNSPECIFIED TYPE: ICD-10-CM

## 2023-01-11 DIAGNOSIS — E06.3 HYPOTHYROIDISM DUE TO HASHIMOTO'S THYROIDITIS: ICD-10-CM

## 2023-01-11 LAB
T4 FREE SERPL-MCNC: 1.53 NG/DL (ref 0.9–1.7)
TSH SERPL DL<=0.005 MIU/L-ACNC: 4.6 UIU/ML (ref 0.3–4.2)

## 2023-01-11 PROCEDURE — 84439 ASSAY OF FREE THYROXINE: CPT

## 2023-01-11 PROCEDURE — 84443 ASSAY THYROID STIM HORMONE: CPT

## 2023-01-11 PROCEDURE — 36415 COLL VENOUS BLD VENIPUNCTURE: CPT

## 2023-01-18 ENCOUNTER — OFFICE VISIT (OUTPATIENT)
Dept: ENDOCRINOLOGY | Facility: CLINIC | Age: 43
End: 2023-01-18
Payer: COMMERCIAL

## 2023-01-18 VITALS
TEMPERATURE: 98.5 F | OXYGEN SATURATION: 100 % | WEIGHT: 173.5 LBS | SYSTOLIC BLOOD PRESSURE: 104 MMHG | DIASTOLIC BLOOD PRESSURE: 64 MMHG | RESPIRATION RATE: 16 BRPM | HEIGHT: 66 IN | BODY MASS INDEX: 27.88 KG/M2 | HEART RATE: 70 BPM

## 2023-01-18 DIAGNOSIS — E06.3 HYPOTHYROIDISM DUE TO HASHIMOTO'S THYROIDITIS: Primary | ICD-10-CM

## 2023-01-18 DIAGNOSIS — E03.9 HYPOTHYROIDISM, UNSPECIFIED TYPE: ICD-10-CM

## 2023-01-18 PROCEDURE — 99214 OFFICE O/P EST MOD 30 MIN: CPT | Performed by: INTERNAL MEDICINE

## 2023-01-18 RX ORDER — LEVOTHYROXINE SODIUM 88 UG/1
88 TABLET ORAL DAILY
Qty: 90 TABLET | Refills: 3 | Status: SHIPPED | OUTPATIENT
Start: 2023-01-18 | End: 2024-01-08

## 2023-01-18 NOTE — PATIENT INSTRUCTIONS
[facility]  Dr Coker, Endocrinology Department    [facility]   [address] [facility]  [place] [address]  Appointment [phone]  Fax: [phone]  [place]: Monday - Thursday      Please check the cost coverage and copay with insurance before recommended tests, services and medications (especially if new medications are prescribed).     If ordered, please get blood work done 1 week prior to your next appointment so they will be available to Dr. Coker at your visit.    Thyroid labs are in acceptable range.  Continue current dose of thyroid hormone replacement-- levothyroxine 88 mcg/day.    Labs in 6 months or sooner if concerns or major weight loss.  Please make a lab appointment for blood work and follow up clinic appointment in 1 week after that to discuss results.    Take Levothyroxine on an empty stomach. Take it with a full glass of water at least 30 minutes to 1 hour before eating breakfast.   This medicine should be taken at least 4 hours before or 4 hours after these medicines: antacids (Maalox , Mylanta , Tums ), calcium supplements, cholestyramine (Prevalite , Questran ), colestipol (Colestid ), iron supplements, orlistat (Kaushik , Xenical ), simethicone (Gas-X , Mylicon ), and sucralfate (Carafate ).   Swallow the capsule whole. Do not cut or crush it.

## 2023-01-18 NOTE — PROGRESS NOTES
ENDOCRINOLOGY CLINIC NOTE:    Name: Gina Baez  Seen for f/u of Hypothyroidism.  HPI:  Gina Baez is a 42 year old female who presents for the evaluation of above.      #1 Hypothyroidism (+TPO):  She was diagnosed in 2012 (in [place]) when she was trying to conceive.  She was started on levothyroxine 50 mcg at that time.  [place].    She was pregnant in 2018. During pregnancy her dose was gradually increased to 112 mcg/day.  Had baby in October 2018.    Currently taking levothyroxine 88 mcg/day.  On this dose since 7/2022.  F/u labs as noted below.    Takes generic.  Reports compliance.    Trying to eat healthy.  Is exercising.  + 10 lbs.    Wt Readings from Last 2 Encounters:   01/18/23 78.7 kg (173 lb 8 oz)   09/21/22 73.8 kg (162 lb 11.2 oz)     Today she is wondering if it is possible to get thyroid in good range without medication with lifestyle changes.  She is also has questions if we can decrease dose of levothyroxine with lifestyle changes.    Feeling OK.  Energy is OK.    Palpitations:  No  Changes to hair or skin: No  Diarrhea/Constipation: No  Changes in menses: No. No plans for pregnancy at this time.  Dysphagia or Shortness of breath:No  Muscle aches or pain:No  Tremors:No  Difficulty sleeping:No  Changes in weight: + wt gain as noted above. Had COVID X 2022.  Heat or cold intolerance: No  History of Lithium or Amiodarone use:No  Head or neck surgery/radiation:No  Family History of Thyroid Problems: No  PMH/PSH:  Past Medical History:   Diagnosis Date     Pregnancy pruritus, third trimester      Thyroid disease     hypothroid     History reviewed. No pertinent surgical history.  Family Hx:  History reviewed. No pertinent family history.    Social Hx:  Social History     Socioeconomic History     Marital status:      Spouse name: Not on file     Number of children: Not on file     Years of education: Not on file     Highest education level: Not on file  "  Occupational History     Not on file   Tobacco Use     Smoking status: Never     Smokeless tobacco: Never   Vaping Use     Vaping Use: Never used   Substance and Sexual Activity     Alcohol use: No     Drug use: No     Sexual activity: Yes     Partners: Male     Birth control/protection: None   Other Topics Concern     Not on file   Social History Narrative     Not on file     Social Determinants of Health     Financial Resource Strain: Not on file   Food Insecurity: Not on file   Transportation Needs: Not on file   Physical Activity: Not on file   Stress: Not on file   Social Connections: Not on file   Intimate Partner Violence: Not on file   Housing Stability: Not on file          MEDICATIONS:  has a current medication list which includes the following prescription(s): levothyroxine.    ROS   ROS: 10 point ROS neg other than the symptoms noted above in the HPI.    Physical Exam   VS: /64 (BP Location: Left arm, Patient Position: Chair, Cuff Size: Adult Regular)   Pulse 70   Temp 98.5  F (36.9  C) (Tympanic)   Resp 16   Ht 1.676 m (5' 5.98\")   Wt 78.7 kg (173 lb 8 oz)   LMP 12/27/2022   SpO2 100%   Breastfeeding No   BMI 28.02 kg/m    GENERAL: healthy, alert and no distress  EYES: Eyes grossly normal to inspection, conjunctivae and sclerae normal  ENT: no nose swelling, nasal discharge.  Thyroid: no apparent thyroid nodules.  Thyroid appears normal in size and nontender.  CV: RRR, no rubs, gallops, no murmurs  RESP: CTAB, no wheezes, rales, or ronchi  ABDO: +BS  EXTREMITIES: no hand tremors.  NEURO: Cranial nerves grossly intact, mentation intact and speech normal  SKIN: No apparent skin lesions, rash or edema seen   PSYCH: mentation appears normal, affect normal/bright, judgement and insight intact, normal speech and appearance well-groomed      LABS:  A1c:  Lab Results   Component Value Date    A1C 5.6 09/19/2022     TFTs:  2/24/2020-TSH 1.67  ENDO THYROID LABS-[facility] Latest Ref Rng & Units " 1/11/2023 9/19/2022   TSH 0.30 - 4.20 uIU/mL 4.60 (H) 5.73 (H)   TSH (EXTERNAL) 0.450 - 4.500 uIU/ml     FREE T4 0.90 - 1.70 ng/dL 1.53 1.34       She was getting care at outside clinic. Records from outside clinic reviewed personally.  She was getting care at [facility] in [place].    All pertinent notes, labs, and images personally reviewed by me.     A/P  Ms.Prajakta Will Baez is a 42 year old here for the evaluation of hypothyroidism:    #1 Hypothyroidism(+TPO):  Persons with Hashimoto's thyroiditis have serum antibodies reacting with TG, TPO, and against an unidentified protein present in colloid.   Currently she is taking levothyroxine 88  mcg/day and on this dose since 7/2022.  Clinically no major concerns.  She is not planning pregnancy.  Plan:  Discussed diagnosis, pathophysiology, management and treatment options of condition with pt.  Discussed 1/2023 labs with her.    She appears euthyroid, not planning pregnancy.  Plan to continue levothyroxine 88 mcg/day.  Labs in 6 months or sooner if concerns or major weight changes.  Discussed to follow-up with local provider if there are any concern when in [facility] and to get thyroid labs checked.  Please make a lab appointment for blood work and follow up clinic appointment in 1 week after that to discuss results.    Discussed s/s of hypothyroidism and hyperthyroidism to watch for.  The patient indicates understanding of these issues and agrees with the plan.      Follow-up:  6 months.    Nereyda Coker MD  Endocrinology  [facility]  CC: Tierney Shaw      All questions were answered.  The patient indicates understanding of the above issues and agrees with the plan set forth.       Answers for HPI/ROS submitted by the patient on 1/18/2023  General Symptoms: No  Skin Symptoms: No  HENT Symptoms: No  EYE SYMPTOMS: No  HEART SYMPTOMS: No  LUNG SYMPTOMS: No  INTESTINAL SYMPTOMS: No  URINARY SYMPTOMS: No  GYNECOLOGIC  SYMPTOMS: No  BREAST SYMPTOMS: No  SKELETAL SYMPTOMS: No  BLOOD SYMPTOMS: No  NERVOUS SYSTEM SYMPTOMS: No  MENTAL HEALTH SYMPTOMS: No

## 2023-01-18 NOTE — LETTER
1/18/2023         RE: Gina Baez  [address] [place] [address]        Dear Colleague,    Thank you for referring your patient, Gina Baez, to the [facility]. Please see a copy of my visit note below.    ENDOCRINOLOGY CLINIC NOTE:    Name: Gina Baez  Seen for f/u of Hypothyroidism.  HPI:  Gina Baez is a 42 year old female who presents for the evaluation of above.      #1 Hypothyroidism (+TPO):  She was diagnosed in 2012 (in [place]) when she was trying to conceive.  She was started on levothyroxine 50 mcg at that time.  [place]    She was pregnant in 2018. During pregnancy her dose was gradually increased to 112 mcg/day.  Had baby in October 2018.    Currently taking levothyroxine 88 mcg/day.  On this dose since 7/2022.  F/u labs as noted below.    Takes generic.  Reports compliance.    Trying to eat healthy.  Is exercising.  + 10 lbs.    Wt Readings from Last 2 Encounters:   01/18/23 78.7 kg (173 lb 8 oz)   09/21/22 73.8 kg (162 lb 11.2 oz)     Today she is wondering if it is possible to get thyroid in good range without medication with lifestyle changes.  She is also has questions if we can decrease dose of levothyroxine with lifestyle changes.    Feeling OK.  Energy is OK.    Palpitations:  No  Changes to hair or skin: No  Diarrhea/Constipation: No  Changes in menses: No. No plans for pregnancy at this time.  Dysphagia or Shortness of breath:No  Muscle aches or pain:No  Tremors:No  Difficulty sleeping:No  Changes in weight: + wt gain as noted above. Had COVID X 2022.  Heat or cold intolerance: No  History of Lithium or Amiodarone use:No  Head or neck surgery/radiation:No  Family History of Thyroid Problems: No  PMH/PSH:  Past Medical History:   Diagnosis Date     Pregnancy pruritus, third trimester      Thyroid disease     hypothroid     History reviewed. No pertinent surgical history.  Family Hx:  History reviewed. No pertinent  "family history.    Social Hx:  Social History     Socioeconomic History     Marital status:      Spouse name: Not on file     Number of children: Not on file     Years of education: Not on file     Highest education level: Not on file   Occupational History     Not on file   Tobacco Use     Smoking status: Never     Smokeless tobacco: Never   Vaping Use     Vaping Use: Never used   Substance and Sexual Activity     Alcohol use: No     Drug use: No     Sexual activity: Yes     Partners: Male     Birth control/protection: None   Other Topics Concern     Not on file   Social History Narrative     Not on file     Social Determinants of Health     Financial Resource Strain: Not on file   Food Insecurity: Not on file   Transportation Needs: Not on file   Physical Activity: Not on file   Stress: Not on file   Social Connections: Not on file   Intimate Partner Violence: Not on file   Housing Stability: Not on file          MEDICATIONS:  has a current medication list which includes the following prescription(s): levothyroxine.    ROS   ROS: 10 point ROS neg other than the symptoms noted above in the HPI.    Physical Exam   VS: /64 (BP Location: Left arm, Patient Position: Chair, Cuff Size: Adult Regular)   Pulse 70   Temp 98.5  F (36.9  C) (Tympanic)   Resp 16   Ht 1.676 m (5' 5.98\")   Wt 78.7 kg (173 lb 8 oz)   LMP 12/27/2022   SpO2 100%   Breastfeeding No   BMI 28.02 kg/m    GENERAL: healthy, alert and no distress  EYES: Eyes grossly normal to inspection, conjunctivae and sclerae normal  ENT: no nose swelling, nasal discharge.  Thyroid: no apparent thyroid nodules.  Thyroid appears normal in size and nontender.  CV: RRR, no rubs, gallops, no murmurs  RESP: CTAB, no wheezes, rales, or ronchi  ABDO: +BS  EXTREMITIES: no hand tremors.  NEURO: Cranial nerves grossly intact, mentation intact and speech normal  SKIN: No apparent skin lesions, rash or edema seen   PSYCH: mentation appears normal, affect " normal/bright, judgement and insight intact, normal speech and appearance well-groomed      LABS:  A1c:  Lab Results   Component Value Date    A1C 5.6 09/19/2022     TFTs:  2/24/2020-TSH 1.67  ENDO THYROID LABS-[facility] Latest Ref Rng & Units 1/11/2023 9/19/2022   TSH 0.30 - 4.20 uIU/mL 4.60 (H) 5.73 (H)   TSH (EXTERNAL) 0.450 - 4.500 uIU/ml     FREE T4 0.90 - 1.70 ng/dL 1.53 1.34       She was getting care at outside clinic. Records from outside clinic reviewed personally.  She was getting care at [facility] women [facility] in [place].    All pertinent notes, labs, and images personally reviewed by me.     A/P  Ms.Prajakta Will Baez is a 42 year old here for the evaluation of hypothyroidism:    #1 Hypothyroidism(+TPO):  Persons with Hashimoto's thyroiditis have serum antibodies reacting with TG, TPO, and against an unidentified protein present in colloid.   Currently she is taking levothyroxine 88  mcg/day and on this dose since 7/2022.  Clinically no major concerns.  She is not planning pregnancy.  Plan:  Discussed diagnosis, pathophysiology, management and treatment options of condition with pt.  Discussed 1/2023 labs with her.    She appears euthyroid, not planning pregnancy.  Plan to continue levothyroxine 88 mcg/day.  Labs in 6 months or sooner if concerns or major weight changes.  Discussed to follow-up with local provider if there are any concern when in [facility] and to get thyroid labs checked.  Please make a lab appointment for blood work and follow up clinic appointment in 1 week after that to discuss results.    Discussed s/s of hypothyroidism and hyperthyroidism to watch for.  The patient indicates understanding of these issues and agrees with the plan.      Follow-up:  6 months.    Nereyda Coker MD  Endocrinology  [facility]  CC: Tierney Shaw      All questions were answered.  The patient indicates understanding of the above issues and agrees with the plan set forth.        Answers for HPI/ROS submitted by the patient on 1/18/2023  General Symptoms: No  Skin Symptoms: No  HENT Symptoms: No  EYE SYMPTOMS: No  HEART SYMPTOMS: No  LUNG SYMPTOMS: No  INTESTINAL SYMPTOMS: No  URINARY SYMPTOMS: No  GYNECOLOGIC SYMPTOMS: No  BREAST SYMPTOMS: No  SKELETAL SYMPTOMS: No  BLOOD SYMPTOMS: No  NERVOUS SYSTEM SYMPTOMS: No  MENTAL HEALTH SYMPTOMS: No          Again, thank you for allowing me to participate in the care of your patient.        Sincerely,        Nereyda Coker MD

## 2023-05-07 ENCOUNTER — HEALTH MAINTENANCE LETTER (OUTPATIENT)
Age: 43
End: 2023-05-07

## 2024-01-02 ENCOUNTER — TELEPHONE (OUTPATIENT)
Dept: ENDOCRINOLOGY | Facility: CLINIC | Age: 44
End: 2024-01-02
Payer: COMMERCIAL

## 2024-01-02 DIAGNOSIS — E06.3 HYPOTHYROIDISM DUE TO HASHIMOTO'S THYROIDITIS: ICD-10-CM

## 2024-01-03 ENCOUNTER — LAB (OUTPATIENT)
Dept: LAB | Facility: CLINIC | Age: 44
End: 2024-01-03
Payer: COMMERCIAL

## 2024-01-03 DIAGNOSIS — E06.3 HYPOTHYROIDISM DUE TO HASHIMOTO'S THYROIDITIS: ICD-10-CM

## 2024-01-03 LAB
T4 FREE SERPL-MCNC: 1.55 NG/DL (ref 0.9–1.7)
TSH SERPL DL<=0.005 MIU/L-ACNC: 4.84 UIU/ML (ref 0.3–4.2)

## 2024-01-03 PROCEDURE — 36415 COLL VENOUS BLD VENIPUNCTURE: CPT

## 2024-01-03 PROCEDURE — 84443 ASSAY THYROID STIM HORMONE: CPT

## 2024-01-03 PROCEDURE — 84439 ASSAY OF FREE THYROXINE: CPT

## 2024-01-03 NOTE — TELEPHONE ENCOUNTER
Pt needs labs now.        Plan to continue levothyroxine 88 mcg/day.  Labs in 6 months or sooner if concerns or major weight changes.  Discussed to follow-up with local provider if there are any concern when in [place] and to get thyroid labs checked.  Please make a lab appointment for blood work and follow up clinic appointment in 1 week after that to discuss results.  Requested Prescriptions   Pending Prescriptions Disp Refills    levothyroxine (SYNTHROID/LEVOTHROID) 88 MCG tablet [Pharmacy Med Name: LEVOTHYROXINE 88 MCG TABLET] 90 tablet 3     Sig: TAKE 1 TABLET BY MOUTH EVERY DAY       Thyroid Protocol Failed - 1/2/2024  5:14 PM        Failed - Normal TSH on file in past 12 months     Recent Labs   Lab Test 01/11/23  0925   TSH 4.60*              Passed - Patient is 12 years or older        Passed - Recent (12 mo) or future (30 days) visit within the authorizing provider's specialty     The patient must have completed an in-person or virtual visit within the past 12 months or has a future visit scheduled within the next 90 days with the authorizing provider s specialty.  Urgent care and e-visits do not quality as an office visit for this protocol.          Passed - Medication is active on med list        Passed - No active pregnancy on record     If patient is pregnant or has had a positive pregnancy test, please check TSH.          Passed - No positive pregnancy test in past 12 months     If patient is pregnant or has had a positive pregnancy test, please check TSH.

## 2024-01-04 NOTE — TELEPHONE ENCOUNTER
Please advise on refill. Any adjustments and if okay to bridge until July appt    LOV:1/18/23  FOV:07/2024  Plan to continue levothyroxine 88 mcg/day.  Labs in 6 months or sooner if concerns or major weight changes.  Discussed to follow-up with local provider if there are any concern when in [place] and to get thyroid labs checked.  Please make a lab appointment for blood work and follow up clinic appointment in 1 week after that to discuss results.  Component      Latest Ref Rng 1/3/2024  3:24 PM   T4 Free      0.90 - 1.70 ng/dL 1.55    TSH      0.30 - 4.20 uIU/mL 4.84 (H)       Legend:  (H) High

## 2024-01-08 RX ORDER — LEVOTHYROXINE SODIUM 88 UG/1
88 TABLET ORAL DAILY
Qty: 90 TABLET | Refills: 0 | Status: SHIPPED | OUTPATIENT
Start: 2024-01-08 | End: 2024-04-01

## 2024-01-08 NOTE — RESULT ENCOUNTER NOTE
Gina    Recently done endocrinology lab test/ imaging test showed:  Thyroid labs are in acceptable range.  If feeling ok- Continue current dose of thyroid hormone replacement.    Here is a copy for your records.    Follow up as discussed in last clinic visit.    Please call endocrinology clinic ( [phone]) if questions.    Nereyda Coker MD  Endocrinology   [facility]  January 7, 2024

## 2024-01-08 NOTE — TELEPHONE ENCOUNTER
Latest Ref Rng 1/11/2023  9:25 AM 1/3/2024  3:24 PM   ENDO THYROID LABS-[facility]      TSH 0.30 - 4.20 uIU/mL 4.60 (H)  4.84 (H)    TSH (External) 0.450 - 4.500 uIU/ml     FREE T4 0.90 - 1.70 ng/dL 1.53  1.55       Labs in acceptable range.  If feeling ok- continue current dose.  Ok to send R if needed.

## 2024-02-25 ENCOUNTER — HEALTH MAINTENANCE LETTER (OUTPATIENT)
Age: 44
End: 2024-02-25

## 2024-03-31 DIAGNOSIS — E06.3 HYPOTHYROIDISM DUE TO HASHIMOTO'S THYROIDITIS: ICD-10-CM

## 2024-04-01 RX ORDER — LEVOTHYROXINE SODIUM 88 UG/1
88 TABLET ORAL DAILY
Qty: 90 TABLET | Refills: 0 | Status: SHIPPED | OUTPATIENT
Start: 2024-04-01 | End: 2024-06-19

## 2024-04-01 NOTE — TELEPHONE ENCOUNTER
Requested Prescriptions   Pending Prescriptions Disp Refills    levothyroxine (SYNTHROID/LEVOTHROID) 88 MCG tablet [Pharmacy Med Name: LEVOTHYROXINE 88 MCG TABLET] 90 tablet 0     Sig: TAKE 1 TABLET BY MOUTH EVERY DAY       Thyroid Protocol Failed - 3/31/2024 10:23 PM        Failed - Recent (12 mo) or future (30 days) visit within the authorizing provider's specialty     The patient must have completed an in-person or virtual visit within the past 12 months or has a future visit scheduled within the next 90 days with the authorizing provider s specialty.  Urgent care and e-visits do not quality as an office visit for this protocol.          Failed - Normal TSH on file in past 12 months     Recent Labs   Lab Test 01/03/24  1524   TSH 4.84*              Passed - Patient is 12 years or older        Passed - Medication is active on med list        Passed - No active pregnancy on record     If patient is pregnant or has had a positive pregnancy test, please check TSH.          Passed - No positive pregnancy test in past 12 months     If patient is pregnant or has had a positive pregnancy test, please check TSH.             Per 1/2/24 TE:  Labs in acceptable range.  If feeling ok- continue current dose.  Ok to send R if needed.

## 2024-06-19 ENCOUNTER — OFFICE VISIT (OUTPATIENT)
Dept: ENDOCRINOLOGY | Facility: CLINIC | Age: 44
End: 2024-06-19
Payer: COMMERCIAL

## 2024-06-19 VITALS
RESPIRATION RATE: 16 BRPM | SYSTOLIC BLOOD PRESSURE: 113 MMHG | TEMPERATURE: 98.9 F | DIASTOLIC BLOOD PRESSURE: 69 MMHG | OXYGEN SATURATION: 99 % | WEIGHT: 174.8 LBS | HEART RATE: 88 BPM | HEIGHT: 66 IN | BODY MASS INDEX: 28.09 KG/M2

## 2024-06-19 DIAGNOSIS — E06.3 HYPOTHYROIDISM DUE TO HASHIMOTO'S THYROIDITIS: Primary | ICD-10-CM

## 2024-06-19 PROCEDURE — 84439 ASSAY OF FREE THYROXINE: CPT | Performed by: INTERNAL MEDICINE

## 2024-06-19 PROCEDURE — G2211 COMPLEX E/M VISIT ADD ON: HCPCS | Performed by: INTERNAL MEDICINE

## 2024-06-19 PROCEDURE — 84443 ASSAY THYROID STIM HORMONE: CPT | Performed by: INTERNAL MEDICINE

## 2024-06-19 PROCEDURE — 99214 OFFICE O/P EST MOD 30 MIN: CPT | Performed by: INTERNAL MEDICINE

## 2024-06-19 PROCEDURE — 36415 COLL VENOUS BLD VENIPUNCTURE: CPT | Performed by: INTERNAL MEDICINE

## 2024-06-19 RX ORDER — LEVOTHYROXINE SODIUM 88 UG/1
88 TABLET ORAL DAILY
Qty: 90 TABLET | Refills: 3 | Status: SHIPPED | OUTPATIENT
Start: 2024-06-19

## 2024-06-19 NOTE — PATIENT INSTRUCTIONS
[facility]  Dr Coker, Endocrinology Department    [facility]   [address] [facility]  [place] [address]  Appointment [phone]  Fax: [phone]  [place]: Monday - Thursday      Please check the cost coverage and copay with insurance before recommended tests, services and medications (especially if new medications are prescribed).     If ordered, please get blood work done 1 week prior to your next appointment so they will be available to Dr. Coker at your visit.    Labs today.  Dose change based on that.  If stable, labs and follow up in 1 year.  Please make a lab appointment for blood work and follow up clinic appointment in 1 week after that to discuss results.    Take Levothyroxine on an empty stomach. Take it with a full glass of water at least 30 minutes to 1 hour before eating breakfast.   This medicine should be taken at least 4 hours before or 4 hours after these medicines: antacids (Maalox , Mylanta , Tums ), calcium supplements, cholestyramine (Prevalite , Questran ), colestipol (Colestid ), iron supplements, orlistat (Kaushik , Xenical ), simethicone (Gas-X , Mylicon ), and sucralfate (Carafate ).   Swallow the capsule whole. Do not cut or crush it.

## 2024-06-19 NOTE — LETTER
6/19/2024      Gina Baez  [address] [place] [address]      Dear Colleague,    Thank you for referring your patient, Gina Baez, to the [facility]. Please see a copy of my visit note below.    ENDOCRINOLOGY CLINIC NOTE:    Name: Gina Baez  Seen for f/u of Hypothyroidism.  HPI:  Gina Baez is a 43 year old female who presents for the evaluation of above.   has a past medical history of Pregnancy pruritus, third trimester and Thyroid disease.    #1 Hypothyroidism (+TPO):  She was diagnosed in 2012 (in [place]) when she was trying to conceive.  She was started on levothyroxine 50 mcg at that time.  [place]    She was pregnant in 2018. During pregnancy her dose was gradually increased to 112 mcg/day.  Had baby in October 2018.    Currently taking levothyroxine 88 mcg/day.  On this dose since 7/2022.  F/u labs as noted below.  No recent labs to review.  She  Takes generic.  Reports compliance.    Trying to eat healthy.  She is exercising regularly.  She is doing intermittent fasting.  Lost 3-4 kg in few months.    Wt Readings from Last 2 Encounters:   06/19/24 79.3 kg (174 lb 12.8 oz)   01/18/23 78.7 kg (173 lb 8 oz)       Feeling OK.  Energy is OK.    Palpitations:  No  Changes to hair or skin: + hair loss  Diarrhea/Constipation: No  Changes in menses: No. No plans for pregnancy at this time.  Dysphagia or Shortness of breath:No  Muscle aches or pain:No  Tremors:No  Difficulty sleeping:No  Changes in weight: as noted above  Heat or cold intolerance: No  History of Lithium or Amiodarone use:No  Head or neck surgery/radiation:No  Family History of Thyroid Problems: No  PMH/PSH:  Past Medical History:   Diagnosis Date     Pregnancy pruritus, third trimester      Thyroid disease     hypothroid     History reviewed. No pertinent surgical history.  Family Hx:  History reviewed. No pertinent family history.    Social Hx:  Social History  "    Socioeconomic History     Marital status:      Spouse name: Not on file     Number of children: Not on file     Years of education: Not on file     Highest education level: Not on file   Occupational History     Not on file   Tobacco Use     Smoking status: Never     Smokeless tobacco: Never   Vaping Use     Vaping status: Never Used   Substance and Sexual Activity     Alcohol use: No     Drug use: No     Sexual activity: Yes     Partners: Male     Birth control/protection: None   Other Topics Concern     Not on file   Social History Narrative     Not on file     Social Determinants of Health     Financial Resource Strain: Not on file   Food Insecurity: Not on file   Transportation Needs: Not on file   Physical Activity: Not on file   Stress: Not on file   Social Connections: Not on file   Interpersonal Safety: Not on file   Housing Stability: Not on file          MEDICATIONS:  has a current medication list which includes the following prescription(s): levothyroxine.    ROS   ROS: 10 point ROS neg other than the symptoms noted above in the HPI.    Physical Exam   VS: /69 (BP Location: Left arm, Patient Position: Chair, Cuff Size: Adult Regular)   Pulse 88   Temp 98.9  F (37.2  C) (Tympanic)   Resp 16   Ht 1.676 m (5' 5.98\")   Wt 79.3 kg (174 lb 12.8 oz)   LMP 05/23/2024   SpO2 99%   Breastfeeding No   BMI 28.23 kg/m    GENERAL: healthy, alert and no distress  EYES: Eyes grossly normal to inspection, conjunctivae and sclerae normal  ENT: no nose swelling, nasal discharge.  Thyroid: no apparent thyroid nodules.  Thyroid appears normal in size and nontender.  CV: RRR, no rubs, gallops, no murmurs  RESP: CTAB, no wheezes, rales, or ronchi  ABDO: +BS  EXTREMITIES: no hand tremors.  NEURO: Cranial nerves grossly intact, mentation intact and speech normal  SKIN: No apparent skin lesions, rash or edema seen   PSYCH: mentation appears normal, affect normal/bright, judgement and insight intact, " normal speech and appearance well-groomed      LABS:  TFTs:  2/24/2020-TSH 1.67   Latest Ref Rng 1/3/2024  3:24 PM   ENDO THYROID LABS-[facility]     TSH 0.30 - 4.20 uIU/mL 4.84 (H)    TSH (External) 0.450 - 4.500 uIU/ml    FREE T4 0.90 - 1.70 ng/dL 1.55       She was getting care at outside clinic. Records from outside clinic reviewed personally.  She was getting care at [facility] in [place].    All pertinent notes, labs, and images personally reviewed by me.     A/P  Ms.Prajakta Will Baez is a 43 year old here for the evaluation of hypothyroidism:    #1 Hypothyroidism(+TPO):  Persons with Hashimoto's thyroiditis have serum antibodies reacting with TG, TPO, and against an unidentified protein present in colloid.   Currently she is taking levothyroxine 88  mcg/day and on this dose since 7/2022.  No recent labs to review.  Clinically no major concerns.  She is not planning pregnancy.  Plan:  Discussed diagnosis, pathophysiology, management and treatment options of condition with pt.  Discussed 1/2023 labs with her.    She appears euthyroid, not planning pregnancy.  Plan to recheck labs.    Dose change based on labs.  If labs are normal--then plan to continue current dose at 88 mcg/day.    If labs are stable then recheck labs and follow-up in 1 year.      Discussed s/s of hypothyroidism and hyperthyroidism to watch for.  The patient indicates understanding of these issues and agrees with the plan.    Discussed indications, risks and benefits of all medications prescribed, and answered questions to patient's satisfaction.  The longitudinal plan of care for the diagnosis(es)/condition(s) as documented were addressed during this visit. Due to the added complexity in care, I will continue to support Gina in the subsequent management and with ongoing continuity of care.  All questions were answered.  The patient indicates understanding of the above issues and agrees with the plan set forth.    Follow-up:  As  noted in AVS    Nereyda Coker MD  Endocrinology  [facility]  CC: Tierney Shaw      All questions were answered.  The patient indicates understanding of the above issues and agrees with the plan set forth.         Again, thank you for allowing me to participate in the care of your patient.        Sincerely,        Nereyda Coker MD

## 2024-06-19 NOTE — PROGRESS NOTES
ENDOCRINOLOGY CLINIC NOTE:    Name: Gina Baez  Seen for f/u of Hypothyroidism.  HPI:  Gina Baez is a 43 year old female who presents for the evaluation of above.   has a past medical history of Pregnancy pruritus, third trimester and Thyroid disease.    #1 Hypothyroidism (+TPO):  She was diagnosed in 2012 (in [place]) when she was trying to conceive.  She was started on levothyroxine 50 mcg at that time.  [place]    She was pregnant in 2018. During pregnancy her dose was gradually increased to 112 mcg/day.  Had baby in October 2018.    Currently taking levothyroxine 88 mcg/day.  On this dose since 7/2022.  F/u labs as noted below.  No recent labs to review.  She  Takes generic.  Reports compliance.    Trying to eat healthy.  She is exercising regularly.  She is doing intermittent fasting.  Lost 3-4 kg in few months.    Wt Readings from Last 2 Encounters:   06/19/24 79.3 kg (174 lb 12.8 oz)   01/18/23 78.7 kg (173 lb 8 oz)       Feeling OK.  Energy is OK.    Palpitations:  No  Changes to hair or skin: + hair loss  Diarrhea/Constipation: No  Changes in menses: No. No plans for pregnancy at this time.  Dysphagia or Shortness of breath:No  Muscle aches or pain:No  Tremors:No  Difficulty sleeping:No  Changes in weight: as noted above  Heat or cold intolerance: No  History of Lithium or Amiodarone use:No  Head or neck surgery/radiation:No  Family History of Thyroid Problems: No  PMH/PSH:  Past Medical History:   Diagnosis Date    Pregnancy pruritus, third trimester     Thyroid disease     hypothroid     History reviewed. No pertinent surgical history.  Family Hx:  History reviewed. No pertinent family history.    Social Hx:  Social History     Socioeconomic History    Marital status:      Spouse name: Not on file    Number of children: Not on file    Years of education: Not on file    Highest education level: Not on file   Occupational History    Not on file   Tobacco Use     "Smoking status: Never    Smokeless tobacco: Never   Vaping Use    Vaping status: Never Used   Substance and Sexual Activity    Alcohol use: No    Drug use: No    Sexual activity: Yes     Partners: Male     Birth control/protection: None   Other Topics Concern    Not on file   Social History Narrative    Not on file     Social Determinants of Health     Financial Resource Strain: Not on file   Food Insecurity: Not on file   Transportation Needs: Not on file   Physical Activity: Not on file   Stress: Not on file   Social Connections: Not on file   Interpersonal Safety: Not on file   Housing Stability: Not on file          MEDICATIONS:  has a current medication list which includes the following prescription(s): levothyroxine.    ROS   ROS: 10 point ROS neg other than the symptoms noted above in the HPI.    Physical Exam   VS: /69 (BP Location: Left arm, Patient Position: Chair, Cuff Size: Adult Regular)   Pulse 88   Temp 98.9  F (37.2  C) (Tympanic)   Resp 16   Ht 1.676 m (5' 5.98\")   Wt 79.3 kg (174 lb 12.8 oz)   LMP 05/23/2024   SpO2 99%   Breastfeeding No   BMI 28.23 kg/m    GENERAL: healthy, alert and no distress  EYES: Eyes grossly normal to inspection, conjunctivae and sclerae normal  ENT: no nose swelling, nasal discharge.  Thyroid: no apparent thyroid nodules.  Thyroid appears normal in size and nontender.  CV: RRR, no rubs, gallops, no murmurs  RESP: CTAB, no wheezes, rales, or ronchi  ABDO: +BS  EXTREMITIES: no hand tremors.  NEURO: Cranial nerves grossly intact, mentation intact and speech normal  SKIN: No apparent skin lesions, rash or edema seen   PSYCH: mentation appears normal, affect normal/bright, judgement and insight intact, normal speech and appearance well-groomed      LABS:  TFTs:  2/24/2020-TSH 1.67   Latest Ref Rng 1/3/2024  3:24 PM   ENDO THYROID LABS-[facility]     TSH 0.30 - 4.20 uIU/mL 4.84 (H)    TSH (External) 0.450 - 4.500 uIU/ml    FREE T4 0.90 - 1.70 ng/dL 1.55       She was " getting care at outside clinic. Records from outside clinic reviewed personally.  She was getting care at [facility] in [place].    All pertinent notes, labs, and images personally reviewed by me.     A/P  Ms.Prajakta Will Baez is a 43 year old here for the evaluation of hypothyroidism:    #1 Hypothyroidism(+TPO):  Persons with Hashimoto's thyroiditis have serum antibodies reacting with TG, TPO, and against an unidentified protein present in colloid.   Currently she is taking levothyroxine 88  mcg/day and on this dose since 7/2022.  No recent labs to review.  Clinically no major concerns.  She is not planning pregnancy.  Plan:  Discussed diagnosis, pathophysiology, management and treatment options of condition with pt.  Discussed 1/2023 labs with her.    She appears euthyroid, not planning pregnancy.  Plan to recheck labs.    Dose change based on labs.  If labs are normal--then plan to continue current dose at 88 mcg/day.    If labs are stable then recheck labs and follow-up in 1 year.      Discussed s/s of hypothyroidism and hyperthyroidism to watch for.  The patient indicates understanding of these issues and agrees with the plan.    Discussed indications, risks and benefits of all medications prescribed, and answered questions to patient's satisfaction.  The longitudinal plan of care for the diagnosis(es)/condition(s) as documented were addressed during this visit. Due to the added complexity in care, I will continue to support Gina in the subsequent management and with ongoing continuity of care.  All questions were answered.  The patient indicates understanding of the above issues and agrees with the plan set forth.    Follow-up:  As noted in AVS    Nereyda Coker MD  Endocrinology  [facility]  CC: Tierney Shaw      All questions were answered.  The patient indicates understanding of the above issues and agrees with the plan set forth.

## 2024-06-20 LAB
T4 FREE SERPL-MCNC: 1.61 NG/DL (ref 0.9–1.7)
TSH SERPL DL<=0.005 MIU/L-ACNC: 4.3 UIU/ML (ref 0.3–4.2)

## 2024-07-14 ENCOUNTER — HEALTH MAINTENANCE LETTER (OUTPATIENT)
Age: 44
End: 2024-07-14

## 2024-12-05 ENCOUNTER — IMMUNIZATION (OUTPATIENT)
Dept: FAMILY MEDICINE | Facility: CLINIC | Age: 44
End: 2024-12-05
Payer: COMMERCIAL

## 2024-12-05 VITALS — TEMPERATURE: 98 F

## 2024-12-05 DIAGNOSIS — Z23 ENCOUNTER FOR IMMUNIZATION: Primary | ICD-10-CM

## 2024-12-05 NOTE — PROGRESS NOTES
Prior to immunization administration, verified patients identity using patient s name and date of birth. Please see Immunization Activity for additional information.     Is the patient's temperature normal (100.5 or less)? Yes     Patient MEETS CRITERIA. PROCEED with vaccine administration.          12/5/2024   COVID   Have you had myocarditis or pericarditis (inflammation of or around the heart muscle) after getting a COVID-19 vaccine? No   Have you had a serious reaction to a COVID vaccine or something in a COVID vaccine, like polyethylene glycol (PEG) or polysorbate? No   Have you had multisystem inflammatory syndrome from COVID-19 in the past 90 days? No   Have you received a bone marrow transplant within the previous 3 months? No            Patient MEETS CRITERIA. PROCEED with vaccine administration.            12/5/2024   INFLUENZA   Would you like to receive the flu shot or the nasal flu vaccine today? Flu Shot   Have you had a serious reaction to a flu vaccine or something in a flu vaccine? No   Have you had Guillain-Bryant syndrome within 6 weeks of getting a vaccine? No   Have you received a bone marrow transplant within the previous 6 months? No            Patient MEETS CRITERIA. PROCEED with vaccine administration.        Patient instructed to remain in clinic for 15 minutes afterwards, and to report any adverse reactions.      Link to Ancillary Visit Immunization Standing Orders SmartSet     Screening performed by Ani Doss MA on 12/5/2024 at 2:56 PM.

## 2024-12-06 PROBLEM — E06.3 HASHIMOTO THYROIDITIS, FIBROUS VARIANT: Status: ACTIVE | Noted: 2022-07-01

## 2024-12-14 ENCOUNTER — LAB (OUTPATIENT)
Dept: LAB | Facility: CLINIC | Age: 44
End: 2024-12-14
Payer: COMMERCIAL

## 2024-12-14 DIAGNOSIS — Z13.220 ENCOUNTER FOR SCREENING FOR LIPID DISORDER: ICD-10-CM

## 2024-12-14 DIAGNOSIS — Z11.59 NEED FOR HEPATITIS C SCREENING TEST: ICD-10-CM

## 2024-12-14 DIAGNOSIS — Z13.228 ENCOUNTER FOR SCREENING FOR METABOLIC DISORDER: ICD-10-CM

## 2024-12-14 DIAGNOSIS — Z00.00 ROUTINE GENERAL MEDICAL EXAMINATION AT A HEALTH CARE FACILITY: ICD-10-CM

## 2024-12-14 DIAGNOSIS — E06.3 HYPOTHYROIDISM DUE TO HASHIMOTO'S THYROIDITIS: ICD-10-CM

## 2024-12-14 DIAGNOSIS — R32 URINARY INCONTINENCE, UNSPECIFIED TYPE: ICD-10-CM

## 2024-12-14 LAB
ALBUMIN SERPL BCG-MCNC: 4.2 G/DL (ref 3.5–5.2)
ALBUMIN UR-MCNC: NEGATIVE MG/DL
ALP SERPL-CCNC: 70 U/L (ref 40–150)
ALT SERPL W P-5'-P-CCNC: 6 U/L (ref 0–50)
ANION GAP SERPL CALCULATED.3IONS-SCNC: 10 MMOL/L (ref 7–15)
APPEARANCE UR: CLEAR
AST SERPL W P-5'-P-CCNC: 16 U/L (ref 0–45)
BILIRUB SERPL-MCNC: 0.4 MG/DL
BILIRUB UR QL STRIP: NEGATIVE
BUN SERPL-MCNC: 7 MG/DL (ref 6–20)
CALCIUM SERPL-MCNC: 9.4 MG/DL (ref 8.8–10.4)
CHLORIDE SERPL-SCNC: 102 MMOL/L (ref 98–107)
CHOLEST SERPL-MCNC: 197 MG/DL
COLOR UR AUTO: YELLOW
CREAT SERPL-MCNC: 0.64 MG/DL (ref 0.51–0.95)
EGFRCR SERPLBLD CKD-EPI 2021: >90 ML/MIN/1.73M2
ERYTHROCYTE [DISTWIDTH] IN BLOOD BY AUTOMATED COUNT: 12.3 % (ref 10–15)
FASTING STATUS PATIENT QL REPORTED: YES
FASTING STATUS PATIENT QL REPORTED: YES
GLUCOSE SERPL-MCNC: 95 MG/DL (ref 70–99)
GLUCOSE UR STRIP-MCNC: NEGATIVE MG/DL
HCO3 SERPL-SCNC: 25 MMOL/L (ref 22–29)
HCT VFR BLD AUTO: 39.4 % (ref 35–47)
HCV AB SERPL QL IA: NONREACTIVE
HDLC SERPL-MCNC: 47 MG/DL
HGB BLD-MCNC: 12.2 G/DL (ref 11.7–15.7)
HGB UR QL STRIP: NEGATIVE
KETONES UR STRIP-MCNC: NEGATIVE MG/DL
LDLC SERPL CALC-MCNC: 133 MG/DL
LEUKOCYTE ESTERASE UR QL STRIP: NEGATIVE
MCH RBC QN AUTO: 25.8 PG (ref 26.5–33)
MCHC RBC AUTO-ENTMCNC: 31 G/DL (ref 31.5–36.5)
MCV RBC AUTO: 84 FL (ref 78–100)
NITRATE UR QL: NEGATIVE
NONHDLC SERPL-MCNC: 150 MG/DL
PH UR STRIP: 6.5 [PH] (ref 5–7)
PLATELET # BLD AUTO: 331 10E3/UL (ref 150–450)
POTASSIUM SERPL-SCNC: 4.2 MMOL/L (ref 3.4–5.3)
PROT SERPL-MCNC: 7.4 G/DL (ref 6.4–8.3)
RBC # BLD AUTO: 4.72 10E6/UL (ref 3.8–5.2)
RBC #/AREA URNS AUTO: NORMAL /HPF
SODIUM SERPL-SCNC: 137 MMOL/L (ref 135–145)
SP GR UR STRIP: 1.01 (ref 1–1.03)
T4 FREE SERPL-MCNC: 1.7 NG/DL (ref 0.9–1.7)
TRIGL SERPL-MCNC: 83 MG/DL
TSH SERPL DL<=0.005 MIU/L-ACNC: 4.65 UIU/ML (ref 0.3–4.2)
UROBILINOGEN UR STRIP-ACNC: 0.2 E.U./DL
WBC # BLD AUTO: 7.4 10E3/UL (ref 4–11)
WBC #/AREA URNS AUTO: NORMAL /HPF

## 2024-12-14 PROCEDURE — 36415 COLL VENOUS BLD VENIPUNCTURE: CPT

## 2024-12-14 PROCEDURE — 80053 COMPREHEN METABOLIC PANEL: CPT

## 2024-12-14 PROCEDURE — 80061 LIPID PANEL: CPT

## 2024-12-14 PROCEDURE — 85027 COMPLETE CBC AUTOMATED: CPT

## 2024-12-14 PROCEDURE — 84439 ASSAY OF FREE THYROXINE: CPT

## 2024-12-14 PROCEDURE — 81001 URINALYSIS AUTO W/SCOPE: CPT

## 2024-12-14 PROCEDURE — 84443 ASSAY THYROID STIM HORMONE: CPT

## 2024-12-14 PROCEDURE — 86803 HEPATITIS C AB TEST: CPT

## 2024-12-16 NOTE — RESULT ENCOUNTER NOTE
Your Cholesterol LDL mildly high. Given your age and no cardiac risk factors , recommend dietery management of avoiding high fat foods and red meat.    Your good Cholesterol HDL is low. Please follow the diet below for improvement.     All your OTHER labs normal, you may see some highlighted which do not have Clinical significance.    Please let me know if you have any questions.  Yolis Castaneda MD on 12/16/2024 at 5:15 PM  [facility]

## 2025-03-17 NOTE — TELEPHONE ENCOUNTER
Problem: Pain - Adult  Goal: Verbalizes/displays adequate comfort level or baseline comfort level  Outcome: Progressing     Problem: Safety - Adult  Goal: Free from fall injury  Outcome: Progressing     Problem: Discharge Planning  Goal: Discharge to home or other facility with appropriate resources  Outcome: Progressing     Problem: Chronic Conditions and Co-morbidities  Goal: Patient's chronic conditions and co-morbidity symptoms are monitored and maintained or improved  Outcome: Progressing     Problem: Nutrition  Goal: Nutrient intake appropriate for maintaining nutritional needs  Outcome: Progressing        OK to book in next available open BONNY slot. Please make sure that one other BONNY slot is open on the given day. There are 2 BONNY slots/day- please keep one slot open for urgent needs.  Let me know if you have any questions.

## 2025-06-09 ENCOUNTER — PATIENT OUTREACH (OUTPATIENT)
Dept: CARE COORDINATION | Facility: CLINIC | Age: 45
End: 2025-06-09
Payer: COMMERCIAL

## 2025-06-19 ENCOUNTER — OFFICE VISIT (OUTPATIENT)
Dept: ENDOCRINOLOGY | Facility: CLINIC | Age: 45
End: 2025-06-19
Payer: COMMERCIAL

## 2025-06-19 VITALS
RESPIRATION RATE: 16 BRPM | OXYGEN SATURATION: 100 % | BODY MASS INDEX: 28.02 KG/M2 | TEMPERATURE: 97.9 F | HEIGHT: 65 IN | WEIGHT: 168.2 LBS | SYSTOLIC BLOOD PRESSURE: 112 MMHG | HEART RATE: 59 BPM | DIASTOLIC BLOOD PRESSURE: 76 MMHG

## 2025-06-19 DIAGNOSIS — E06.3 HYPOTHYROIDISM DUE TO HASHIMOTO'S THYROIDITIS: Primary | ICD-10-CM

## 2025-06-19 RX ORDER — LEVOTHYROXINE SODIUM 100 UG/1
100 TABLET ORAL
Qty: 90 TABLET | Refills: 1 | Status: SHIPPED | OUTPATIENT
Start: 2025-06-19

## 2025-06-19 NOTE — LETTER
6/19/2025      Gina Baez  [address] [place] [address]      Dear Colleague,    Thank you for referring your patient, Gina Baez, to the [facility]. Please see a copy of my visit note below.    ENDOCRINOLOGY CLINIC NOTE:    Name: Gina Baez  Seen for f/u of Hypothyroidism.  HPI:  Gina Baez is a 44 year old female who presents for the evaluation of above.   has a past medical history of Pregnancy pruritus, third trimester and Thyroid disease.    #1 Hypothyroidism (+TPO):  She was diagnosed in 2012 (in [place]) when she was trying to conceive.  She was started on levothyroxine 50 mcg at that time.  [place]    She was pregnant in 2018. During pregnancy her dose was gradually increased to 112 mcg/day.  Had baby in October 2018.    Currently taking levothyroxine 88 mcg/day.  On this dose since 7/2022.  Takes generic.  Reports compliance.    Trying to eat healthy.  She is exercising regularly.  She is doing intermittent fasting.  Lost 3-4 kg in few months.    Wt Readings from Last 2 Encounters:   06/19/25 76.3 kg (168 lb 3.2 oz)   12/06/24 78 kg (172 lb)       Feeling OK.  Energy is OK.    Palpitations:  No  Changes to hair or skin: + hair loss- no change  Diarrhea/Constipation: No  Changes in menses: No. No plans for pregnancy at this time.  Dysphagia or Shortness of breath:No  Muscle aches or pain:No  Tremors:No  Difficulty sleeping: thinks that she needs more sleep these days.  Changes in weight: as noted above  Heat or cold intolerance: No  History of Lithium or Amiodarone use:No  Head or neck surgery/radiation:No  Family History of Thyroid Problems: No  PMH/PSH:  Past Medical History:   Diagnosis Date     Pregnancy pruritus, third trimester      Thyroid disease     hypothroid     History reviewed. No pertinent surgical history.  Family Hx:  History reviewed. No pertinent family history.    Social Hx:  Social History     Socioeconomic  History     Marital status:      Spouse name: Not on file     Number of children: Not on file     Years of education: Not on file     Highest education level: Not on file   Occupational History     Not on file   Tobacco Use     Smoking status: Never     Smokeless tobacco: Never   Vaping Use     Vaping status: Never Used   Substance and Sexual Activity     Alcohol use: No     Drug use: No     Sexual activity: Yes     Partners: Male     Birth control/protection: None   Other Topics Concern     Not on file   Social History Narrative     Not on file     Social Drivers of Health     Financial Resource Strain: Low Risk  (12/6/2024)    Financial Resource Strain      Within the past 12 months, have you or your family members you live with been unable to get utilities (heat, electricity) when it was really needed?: No   Food Insecurity: Low Risk  (12/6/2024)    Food Insecurity      Within the past 12 months, did you worry that your food would run out before you got money to buy more?: No      Within the past 12 months, did the food you bought just not last and you didn t have money to get more?: No   Transportation Needs: Low Risk  (12/6/2024)    Transportation Needs      Within the past 12 months, has lack of transportation kept you from medical appointments, getting your medicines, non-medical meetings or appointments, work, or from getting things that you need?: No   Physical Activity: Unknown (12/6/2024)    Exercise Vital Sign      Days of Exercise per Week: 1 day      Minutes of Exercise per Session: Not on file   Stress: No Stress Concern Present (12/6/2024)    [employer] - Occupational Stress Questionnaire      Feeling of Stress : Only a little   Social Connections: Unknown (12/6/2024)    Social Connection and Isolation Panel [NHANES]      Frequency of Communication with Friends and Family: Not on file      Frequency of Social Gatherings with Friends and Family: Once a week       "Attends [employer] Services: Not on file      Active Member of Clubs or Organizations: Not on file      Attends Club or Organization Meetings: Not on file      Marital Status: Not on file   Interpersonal Safety: Low Risk  (12/6/2024)    Interpersonal Safety      Do you feel physically and emotionally safe where you currently live?: Yes      Within the past 12 months, have you been hit, slapped, kicked or otherwise physically hurt by someone?: No      Within the past 12 months, have you been humiliated or emotionally abused in other ways by your partner or ex-partner?: No   Housing Stability: Low Risk  (12/6/2024)    Housing Stability      Do you have housing? : Yes      Are you worried about losing your housing?: No          MEDICATIONS:  has a current medication list which includes the following prescription(s): levothyroxine.    ROS   ROS: 10 point ROS neg other than the symptoms noted above in the HPI.    Physical Exam   VS: /76 (BP Location: Left arm, Patient Position: Chair, Cuff Size: Adult Regular)   Pulse 59   Temp 97.9  F (36.6  C) (Tympanic)   Resp 16   Ht 1.646 m (5' 4.8\")   Wt 76.3 kg (168 lb 3.2 oz)   LMP 06/12/2025   SpO2 100%   Breastfeeding No   BMI 28.16 kg/m    GENERAL: healthy, alert and no distress  EYES: Eyes grossly normal to inspection, conjunctivae and sclerae normal  ENT: no nose swelling, nasal discharge.  Thyroid: no apparent thyroid nodules.  Thyroid appears normal in size and nontender.  CV: RRR, no rubs, gallops, no murmurs  RESP: CTAB, no wheezes, rales, or ronchi  ABDO: +BS  EXTREMITIES: no hand tremors.  NEURO: Cranial nerves grossly intact, mentation intact and speech normal  SKIN: No apparent skin lesions, rash or edema seen   PSYCH: mentation appears normal, affect normal/bright, judgement and insight intact, normal speech and appearance well-groomed      LABS:  TFTs:  2/24/2020-TSH 1.67  5/2025- in [place]: TSH 7.83, (H), F4 6.57 (n) T3 86.7 (n), tpo >1000 (h)   " Latest Ref Rng 12/14/2024  10:12 AM   ENDO THYROID LABS-[facility]     TSH 0.30 - 4.20 uIU/mL 4.65 (H)    TSH (External) 0.450 - 4.500 uIU/ml    FREE T4 0.90 - 1.70 ng/dL 1.70      TPO:   Latest Ref Rng 5/4/2022  10:33 AM   ENDO THYROID LABS-[facility]     Thyroid Peroxidase Antibody <35 IU/mL 966 (H)      She was getting care at outside clinic. Records from outside clinic reviewed personally.  She was getting care at [facility] in [place].    All pertinent notes, labs, and images personally reviewed by me.     A/P  Ms.Prajakta Will Baez is a 44 year old here for the evaluation of hypothyroidism:    #1 Hypothyroidism(+TPO):  Persons with Hashimoto's thyroiditis have serum antibodies reacting with TG, TPO, and against an unidentified protein present in colloid.   Currently she is taking levothyroxine 88  mcg/day and on this dose since 7/2022.  No recent labs to review.  Clinically no major concerns.  She is not planning pregnancy.  Plan:  Discussed diagnosis, pathophysiology, management and treatment options of condition with pt.  She appears euthyroid, not planning pregnancy.  Based on 5/205 labs ( in [place]) with high TSH recommend trial of higher dose.  Increase levothyroxine to 100 mcg/day (6/19/2025)  Labs in 3 months.  Please make a lab appointment for blood work and follow up clinic appointment in 1 week after that to discuss results.    Plan: levothyroxine (SYNTHROID/LEVOTHROID) 100 MCG         tablet, T4 free, TSH          Discussed s/s of hypothyroidism and hyperthyroidism to watch for.  The patient indicates understanding of these issues and agrees with the plan.    Discussed indications, risks and benefits of all medications prescribed, and answered questions to patient's satisfaction.  The longitudinal plan of care for the diagnosis(es)/condition(s) as documented were addressed during this visit. Due to the added complexity in care, I will continue to support Gina in the subsequent management and  with ongoing continuity of care.  All questions were answered.  The patient indicates understanding of the above issues and agrees with the plan set forth.    Follow-up:  As noted in AVS    Nereyda Coker MD  Endocrinology  [facility]  CC: Tierney Shaw             Again, thank you for allowing me to participate in the care of your patient.        Sincerely,        Nereyda Coker MD    Electronically signed

## 2025-06-19 NOTE — PROGRESS NOTES
ENDOCRINOLOGY CLINIC NOTE:    Name: Gina Baez  Seen for f/u of Hypothyroidism.  HPI:  Gina Baez is a 44 year old female who presents for the evaluation of above.   has a past medical history of Pregnancy pruritus, third trimester and Thyroid disease.    #1 Hypothyroidism (+TPO):  She was diagnosed in 2012 (in [place]) when she was trying to conceive.  She was started on levothyroxine 50 mcg at that time.  [place]oved [place] in [place].    She was pregnant in 2018. During pregnancy her dose was gradually increased to 112 mcg/day.  Had baby in October 2018.    Currently taking levothyroxine 88 mcg/day.  On this dose since 7/2022.  Takes generic.  Reports compliance.    Trying to eat healthy.  She is exercising regularly.  She is doing intermittent fasting.  Lost 3-4 kg in few months.    Wt Readings from Last 2 Encounters:   06/19/25 76.3 kg (168 lb 3.2 oz)   12/06/24 78 kg (172 lb)       Feeling OK.  Energy is OK.    Palpitations:  No  Changes to hair or skin: + hair loss- no change  Diarrhea/Constipation: No  Changes in menses: No. No plans for pregnancy at this time.  Dysphagia or Shortness of breath:No  Muscle aches or pain:No  Tremors:No  Difficulty sleeping: thinks that she needs more sleep these days.  Changes in weight: as noted above  Heat or cold intolerance: No  History of Lithium or Amiodarone use:No  Head or neck surgery/radiation:No  Family History of Thyroid Problems: No  PMH/PSH:  Past Medical History:   Diagnosis Date    Pregnancy pruritus, third trimester     Thyroid disease     hypothroid     History reviewed. No pertinent surgical history.  Family Hx:  History reviewed. No pertinent family history.    Social Hx:  Social History     Socioeconomic History    Marital status:      Spouse name: Not on file    Number of children: Not on file    Years of education: Not on file    Highest education level: Not on file   Occupational History    Not on file   Tobacco Use    Smoking status:  Never    Smokeless tobacco: Never   Vaping Use    Vaping status: Never Used   Substance and Sexual Activity    Alcohol use: No    Drug use: No    Sexual activity: Yes     Partners: Male     Birth control/protection: None   Other Topics Concern    Not on file   Social History Narrative    Not on file     Social Drivers of Health     Financial Resource Strain: Low Risk  (12/6/2024)    Financial Resource Strain     Within the past 12 months, have you or your family members you live with been unable to get utilities (heat, electricity) when it was really needed?: No   Food Insecurity: Low Risk  (12/6/2024)    Food Insecurity     Within the past 12 months, did you worry that your food would run out before you got money to buy more?: No     Within the past 12 months, did the food you bought just not last and you didn t have money to get more?: No   Transportation Needs: Low Risk  (12/6/2024)    Transportation Needs     Within the past 12 months, has lack of transportation kept you from medical appointments, getting your medicines, non-medical meetings or appointments, work, or from getting things that you need?: No   Physical Activity: Unknown (12/6/2024)    Exercise Vital Sign     Days of Exercise per Week: 1 day     Minutes of Exercise per Session: Not on file   Stress: No Stress Concern Present (12/6/2024)    [employer] [place] [employer] - Occupational Stress Questionnaire     Feeling of Stress : Only a little   Social Connections: Unknown (12/6/2024)    Social Connection and Isolation Panel [NHANES]     Frequency of Communication with Friends and Family: Not on file     Frequency of Social Gatherings with Friends and Family: Once a week     Attends Sikhism Services: Not on file     Active Member of Clubs or Organizations: Not on file     Attends Club or Organization Meetings: Not on file     Marital Status: Not on file   Interpersonal Safety: Low Risk  (12/6/2024)    Interpersonal Safety     Do you feel  "physically and emotionally safe where you currently live?: Yes     Within the past 12 months, have you been hit, slapped, kicked or otherwise physically hurt by someone?: No     Within the past 12 months, have you been humiliated or emotionally abused in other ways by your partner or ex-partner?: No   Housing Stability: Low Risk  (12/6/2024)    Housing Stability     Do you have housing? : Yes     Are you worried about losing your housing?: No          MEDICATIONS:  has a current medication list which includes the following prescription(s): levothyroxine.    ROS   ROS: 10 point ROS neg other than the symptoms noted above in the HPI.    Physical Exam   VS: /76 (BP Location: Left arm, Patient Position: Chair, Cuff Size: Adult Regular)   Pulse 59   Temp 97.9  F (36.6  C) (Tympanic)   Resp 16   Ht 1.646 m (5' 4.8\")   Wt 76.3 kg (168 lb 3.2 oz)   LMP 06/12/2025   SpO2 100%   Breastfeeding No   BMI 28.16 kg/m    GENERAL: healthy, alert and no distress  EYES: Eyes grossly normal to inspection, conjunctivae and sclerae normal  ENT: no nose swelling, nasal discharge.  Thyroid: no apparent thyroid nodules.  Thyroid appears normal in size and nontender.  CV: RRR, no rubs, gallops, no murmurs  RESP: CTAB, no wheezes, rales, or ronchi  ABDO: +BS  EXTREMITIES: no hand tremors.  NEURO: Cranial nerves grossly intact, mentation intact and speech normal  SKIN: No apparent skin lesions, rash or edema seen   PSYCH: mentation appears normal, affect normal/bright, judgement and insight intact, normal speech and appearance well-groomed      LABS:  TFTs:  2/24/2020-TSH 1.67  5/2025- in [place]: TSH 7.83, (H), F4 6.57 (n) T3 86.7 (n), tpo >1000 (h)   Latest Ref Rng 12/14/2024  10:12 AM   [facility]     TSH 0.30 - 4.20 uIU/mL 4.65 (H)    TSH (External) 0.450 - 4.500 uIU/ml    FREE T4 0.90 - 1.70 ng/dL 1.70      TPO:   Latest Ref Rng 5/4/2022  10:33 AM   [facility]     Thyroid Peroxidase Antibody <35 IU/mL " 966 (H)      She was getting care at outside clinic. Records from outside clinic reviewed personally.  She was getting care at [facility] in [place].    All pertinent notes, labs, and images personally reviewed by me.     A/P  Ms.Prajakta Will Baez is a 44 year old here for the evaluation of hypothyroidism:    #1 Hypothyroidism(+TPO):  Persons with Hashimoto's thyroiditis have serum antibodies reacting with TG, TPO, and against an unidentified protein present in colloid.   Currently she is taking levothyroxine 88  mcg/day and on this dose since 7/2022.  No recent labs to review.  Clinically no major concerns.  She is not planning pregnancy.  Plan:  Discussed diagnosis, pathophysiology, management and treatment options of condition with pt.  She appears euthyroid, not planning pregnancy.  Based on 5/205 labs ( in [place]) with high TSH recommend trial of higher dose.  Increase levothyroxine to 100 mcg/day (6/19/2025)  Labs in 3 months.  Please make a lab appointment for blood work and follow up clinic appointment in 1 week after that to discuss results.    Plan: levothyroxine (SYNTHROID/LEVOTHROID) 100 MCG         tablet, T4 free, TSH          Discussed s/s of hypothyroidism and hyperthyroidism to watch for.  The patient indicates understanding of these issues and agrees with the plan.    Discussed indications, risks and benefits of all medications prescribed, and answered questions to patient's satisfaction.  The longitudinal plan of care for the diagnosis(es)/condition(s) as documented were addressed during this visit. Due to the added complexity in care, I will continue to support Gina in the subsequent management and with ongoing continuity of care.  All questions were answered.  The patient indicates understanding of the above issues and agrees with the plan set forth.    Follow-up:  As noted in AVS    Nereyda Coker MD  Endocrinology  [facility]  CC: Tierney Shaw  [place]

## 2025-06-19 NOTE — PATIENT INSTRUCTIONS
[facility]  Dr Coker, Endocrinology Department    [facility]   [address]  [place] [address]  Appointment [phone]  Fax: [phone]  [place]: Monday - Thursday      Please check the cost coverage and copay with insurance before recommended tests, services and medications (especially if new medications are prescribed).     If ordered, please get blood work done 1 week prior to your next appointment so they will be available to Dr. Coker at your visit.    Increase levothyroxine to 100 mcg/day  Labs in 3 months.  Please make a lab appointment for blood work and follow up clinic appointment in 1 week after that to discuss results.    Take Levothyroxine on an empty stomach. Take it with a full glass of water at least 30 minutes to 1 hour before eating breakfast.   This medicine should be taken at least 4 hours before or 4 hours after these medicines: antacids (Maalox , Mylanta , Tums ), calcium supplements, cholestyramine (Prevalite , Questran ), colestipol (Colestid ), iron supplements, orlistat (Kaushik , Xenical ), simethicone (Gas-X , Mylicon ), and sucralfate (Carafate ).   Swallow the capsule whole. Do not cut or crush it.